# Patient Record
Sex: MALE | Race: WHITE | NOT HISPANIC OR LATINO | URBAN - METROPOLITAN AREA
[De-identification: names, ages, dates, MRNs, and addresses within clinical notes are randomized per-mention and may not be internally consistent; named-entity substitution may affect disease eponyms.]

---

## 2017-03-01 ENCOUNTER — TRANSCRIBE ORDERS (OUTPATIENT)
Dept: ADMINISTRATIVE | Facility: HOSPITAL | Age: 67
End: 2017-03-01

## 2017-03-01 ENCOUNTER — APPOINTMENT (OUTPATIENT)
Dept: LAB | Facility: MEDICAL CENTER | Age: 67
End: 2017-03-01
Payer: MEDICARE

## 2017-03-01 DIAGNOSIS — I71.2 THORACIC AORTIC ANEURYSM WITHOUT RUPTURE (HCC): ICD-10-CM

## 2017-03-01 DIAGNOSIS — I44.7 LEFT BUNDLE-BRANCH BLOCK: ICD-10-CM

## 2017-03-01 DIAGNOSIS — R73.09 OTHER ABNORMAL GLUCOSE: ICD-10-CM

## 2017-03-01 LAB
CHOLEST SERPL-MCNC: 215 MG/DL (ref 50–200)
EST. AVERAGE GLUCOSE BLD GHB EST-MCNC: 117 MG/DL
HBA1C MFR BLD: 5.7 % (ref 4.2–6.3)
HDLC SERPL-MCNC: 52 MG/DL (ref 40–60)
LDLC SERPL CALC-MCNC: 129 MG/DL (ref 0–100)
TRIGL SERPL-MCNC: 171 MG/DL

## 2017-03-01 PROCEDURE — 80061 LIPID PANEL: CPT

## 2017-03-01 PROCEDURE — 36415 COLL VENOUS BLD VENIPUNCTURE: CPT

## 2017-03-01 PROCEDURE — 83036 HEMOGLOBIN GLYCOSYLATED A1C: CPT

## 2017-03-09 ENCOUNTER — HOSPITAL ENCOUNTER (OUTPATIENT)
Dept: NON INVASIVE DIAGNOSTICS | Facility: HOSPITAL | Age: 67
Discharge: HOME | End: 2017-03-09
Payer: MEDICARE

## 2017-03-09 DIAGNOSIS — R73.09 OTHER ABNORMAL GLUCOSE: ICD-10-CM

## 2017-03-09 PROCEDURE — 93225 XTRNL ECG REC<48 HRS REC: CPT

## 2017-03-09 PROCEDURE — 93226 XTRNL ECG REC<48 HR SCAN A/R: CPT

## 2017-11-01 ENCOUNTER — HOSPITAL ENCOUNTER (OUTPATIENT)
Dept: NON INVASIVE DIAGNOSTICS | Facility: CLINIC | Age: 67
Discharge: HOME | End: 2017-11-01
Payer: MEDICARE

## 2017-11-01 DIAGNOSIS — R73.09 OTHER ABNORMAL GLUCOSE: ICD-10-CM

## 2017-11-01 PROCEDURE — 93306 TTE W/DOPPLER COMPLETE: CPT

## 2017-11-21 ENCOUNTER — ALLSCRIPTS OFFICE VISIT (OUTPATIENT)
Dept: OTHER | Facility: OTHER | Age: 67
End: 2017-11-21

## 2017-11-21 ENCOUNTER — TRANSCRIBE ORDERS (OUTPATIENT)
Dept: ADMINISTRATIVE | Facility: HOSPITAL | Age: 67
End: 2017-11-21

## 2017-11-21 DIAGNOSIS — I35.9 AORTIC VALVE DISEASE: Primary | ICD-10-CM

## 2018-01-15 NOTE — CONSULTS
Chief Complaint  Patient presents for a routine follow up with test results  History of Present Illness  Aortic root replacement with a Bovie aortic tissue valve April 10, 2013  He went home on amiodarone for one month  No further episodes of atrial fibrillation or flutter  His initial surgery was mechanical aortic valve replacement the year 2000  At that time he was quoted as a unicuspid valve  He has multiple sclerosis and is under therapy at this time  Holter counter in may of 2013 showed bradycardia  He is off the amiodarone  At that time he was on amiodarone and beta blocker  He is presently just on Metroprolol 100 daily  During his heart catheterization his coronaries were normal   He's had C4-C5 anterior cervical discectomy and fusion at [facility]  He has a history of colon polyps  His a history of basal cell carcinoma 3 separate occasions  There is a family history of melanoma  He has no issues with his cardiac system at the present time  Echocardiogram done June of 2013  Mild left ventricular hypertrophy with ejection fraction of 60%  There is left atrial enlargement  The proximal aorta 3 9 cm    Patient seen 11 6 2014  As noted above he has a low been aortic tissue valve put in April 2013  His aortic root has been repaired at that time as well  He also has multiple sclerosis, he had postoperative atrial fibrillation, no atrial fibrillation since his surgery, he is off amiodarone, his coronaries are normal, he has cervical discectomy and fusion at [facility] in the past,  Recently had an episode of near syncope  His is followed by diaphoresis  He went to [facility]  Monitor for 4 days  No rigidity is noted  In addition he had an event recorder past months  No evidence of atrial fib  Last echocardiogram June 2014 shows left ventricle normal  Prosthetic valve is functioning well  There are no cardiac issues    His multiple sclerosis seems to be progressive he is going to [facility]    Patient seen November 10, 2015  He had a Bovie and aortic valve and aortic root replacement April 2013  First mechanical valves in year 2000  He has had no further atrial fibrillation  The new issue for him if she has developed a first-degree AV block and a left bundle-branch block pattern  He's had no syncope or near-syncope  His multiple sclerosis unfortunately has progressive  He an echocardiogram in June  His aortic valve is functioning well  Plan an echocardiogram in one year and a Holter before that time  He'll report any syncope  Patient seen November 1, 2016  He has second bioprosthetic valve  Aortic root  2013  He's had no further atrial fibrillation  Echocardiogram shows a 12 mm gradient  His primary issue has multiple sclerosis under therapy  Cardiac standpoint he has been very stable    Patient seen November 21, 2017  Echocardiogram shows a very mild paravalvular leak  This is his 2nd valve in the aortic root was repaired  It was done in 2013  He is getting a new drug for his multiple sclerosis  He is progressing slowly  He is really doing all of things he wants to do  He still hunts  From cardiac standpoint he has no limitation  He did not have any bypass surgery  His LDL is 129  We will think about putting him on a statin  Review of Systems      Cardiac: has heart murmur present, but No complaints of chest pain, no palpitations, no fainiting  Skin: History basal cell  Genitourinary: No complaints of recurrent urinary tract infections, frequent urination at night, difficult urination, blood in urine, kidney stones, loss of bladder control, no kidney or prostate problems, no erectile dysfunction  Psychological: depression   Doing better and accepting his limitations better  General:  Gained weight  Was taking some steroids  Respiratory: No complaints of shortness of breath, cough with sputum, or wheezing     HEENT: No complaints of serious problems, hearing problems, nose problems, throat problems, or snoring  Gastrointestinal: No complaints of liver problems, nausea, vomiting, heartburn, constipation, bloody stools, diarrhea, problems swallowing, adbominal pain, or rectal bleeding  Neurological: Multiple sclerosis progressive  Musculoskeletal: Increasing disability on the right side  Active Problems    1  Abnormal blood sugar (790 29) (R73 09)   2  Aneurysm of thoracic aorta (441 2) (I71 2)   3  Aortic stenosis (424 1) (I35 0)   4  Aortic valve disorder (424 1) (I35 9)   5  Atrial fibrillation (427 31) (I48 91)   6  LBBB (left bundle branch block) (426 3) (I44 7)   7  Medication Intolerance (995 27)   8  Multiple sclerosis (340) (G35)    Past Medical History    · History of Atrial flutter (427 32) (I48 92)   · History of basal cell carcinoma (V10 83) (Z85 828)   · History of Inflammatory Polyps Of Colon (556 4)    Surgical History    · History of Aortic Valve Replacement   · History of Spinal Diskectomy Cervical    Family History    · Family history of Mitral Valve Disorder    · Family history of Malignant Melanoma Of The Skin (V16 8)    Social History    · Denied: History of Alcohol Use (History)   · Marital History - Currently    · Never A Smoker  The social history was reviewed and is unchanged  Current Meds   1  Amoxicillin 500 MG Oral Tablet; take 4 tabs 1 hour prior to dental work  Requested for:   [id number]; Last Rx:23Dec2013 Ordered   2  Aspirin 81 MG TABS; Take 1 tablet daily; Therapy: (Recorded:23Dec2013) to Recorded   3  Biotin TABS; Therapy: (Recorded:10Nov2015) to Recorded   4  Fish Oil 1200 MG Oral Capsule; Therapy: (Recorded:10Nov2015) to Recorded   5  Metoprolol Succinate  MG Oral Tablet Extended Release 24 Hour; TAKE 1 TABLET   ONCE DAILY  Requested for: [id number]; Last Rx:[id number] Ordered   6  Multiple Vitamins TABS; Therapy: (Recorded:23Dec2013) to Recorded   7  Vitamin B Complex TABS;    Therapy: (Recorded:10Nov2015) to Recorded   8  Vitamin D3 2000 UNIT Oral Tablet; Therapy: (Recorded:62Qvj2192) to Recorded    The medication list was reviewed and updated today  Allergies    1  Statins   2  Zocor TABS    Vitals   Recorded: 21Nov2017 11:19AM   Heart Rate 55   Systolic 070, LUE, Sitting   Diastolic 80, LUE, Sitting   BP CUFF SIZE Large   Height 5 ft 10 in   Weight 242 lb 1 oz   BMI Calculated 34 73   BSA Calculated 2 26   O2 Saturation 96     Physical Exam    Constitutional   General appearance: Abnormal   Right-sided weakness weight gain  Pulmonary   Auscultation of lungs: Clear to auscultation, no rales, no rhonchi, no wheezing, good air movement  Cardiovascular   Palpation of heart: Normal PMI, no thrills  Mild aortic regurgitation on echo  Carotid pulses: Normal, 2+ bilaterally  Examination of extremities for edema and/or varicosities: Normal     Musculoskeletal Gait and station: Abnormal    Neurologic - Cranial nerves: II - XII intact  His right-sided weakness of multiple sclerosis  Psychiatric - Orientation to person, place, and time: Normal  Mood and affect: Normal       Assessment    1  LBBB (left bundle branch block) (426 3) (I44 7)   2  Abnormal blood sugar (790 29) (R73 09)   3  Aortic stenosis (424 1) (I35 0)   4  Aortic valve disorder (424 1) (I35 9)   5  Atrial fibrillation (427 31) (I48 91)    Plan  Aneurysm of thoracic aorta, Aortic valve disorder    · (1) LIPID PANEL, FASTING; Status:Active; Requested CMW:[id number]; Perform:[facility]; RKK:[id number]; For:Aneurysm of thoracic aorta, Aortic valve disorder; Ordered By:Diego Maria; Aortic valve disorder    · (1) CBC/PLT/DIFF; Status:Active; Requested OCS:[id number]; Perform:[facility]; XIX:[id number];XGRRDYM; For:Aortic valve disorder; Ordered By:Junaid Maria;   · (1) COMPREHENSIVE METABOLIC PANEL; Status:Active; Requested UZQ:[id number];     Perform:[facility]; IBB:[id number];CMNXPWH; For:Aortic valve disorder; Ordered By:Junaid Maria;   · (1) NT- BNP (PRO BRAIN NATRIURETIC PEPTIDE); Status:Active; Requested  QKN:[id number]; Perform:[facility]; JCR:[id number];REDEBSB; For:Aortic valve disorder; Ordered By:Junaid Maria;   · ECHO COMPLETE WITH CONTRAST IF INDICATED; Status:Hold For - Scheduling;  Requested for:21Nov2018; Perform:[facility]; VEP:[id number];VUKQKYT; For:Aortic valve disorder; Ordered By:Dallin Maria;   · EKG/ECG- POC; Status:Active - Perform Order; Requested for: With next appointment;    Perform: In Office; [phone]; Ordered; For:Aortic valve disorder; Ordered By:Junaid Maria;   · HOLTER MONITOR - 24 HOUR; Status:Hold For - Scheduling; Requested  for:21Nov2018; Perform:[facility]; QHK:[id number];GIHBLWC; For:Aortic valve disorder; Ordered By:Junaid Maria;   · Follow Up After Tests Complete Evaluation and Treatment  Follow-up  Status: Hold For -  Scheduling  Requested for: [id number]   Ordered; For: Aortic valve disorder; Ordered By: Will Jones Performed:  Due: 42GCC4718    Discussion/Summary    From a cardiac standpoint he had a Bovie and aortic tissue valve and aortic root repair in April 2013  He had postoperative atrial fibrillation  No evidence of atrial fibrillation since  Recently admitted to [facility] end of diagnoses vasovagal syncope  His multiple sclerosis is his major issue  We had an event recorders done showing no evidence of any arrhythmias  His very sensitive to 8 fibrillation he does not feel any palpitations  I did not see any reason that he cannot drive from a cardiac standpoint  However from a standpoint of multiple sclerosis he may develop weakness in his right leg and may preclude him from driving  He'll be a neurology decision    Patient seen November 10, 2015  As noted in history of present illness, the left bundle-branch block pattern is new   Pulse was a first-degree AV block  His been asymptomatic  Will check the echo and the year  The 48 hour Holter in the interim  Patient seen November 1, 2016  He has been stable  He has gained some weight  He still batting battling his multiple sclerosis his echocardiogram shows a 12 mm gradient  This will be repeated next year    Patient seen November 21st  Cardiac stable  The only issue is perhaps he should be on a statin  He had no vascular disease other than his aortic root been enlarged from bicuspid aortic valve  His LDL is up to 129  Will check in 6 months to make a decision        Signatures   Electronically signed by : MEÑO Miramontes ; Nov 21 2017 11:56AM EST                       (Author)

## 2018-01-15 NOTE — RESULT NOTES
Verified Results  ECHO COMPLETE WITH CONTRAST IF INDICATED [id number] 09:57AM Brian Robledo     Test Name Result Flag Reference   ECHO COMPLETE WITH CONTRAST IF INDICATED (Report)     [address]   [phone]     Transthoracic Echocardiogram   2D, M-mode, and Color Doppler     Study date: 2016     Patient: Jessy Gonzalez   MR number: [id number]   Account number: [de-identified]   : 1950   Age: 72 years   Gender: Male   Status: Outpatient   Location: [place] [address]    Height: 70 in   Weight: 220 4 lb   BP: 128/ 65 mmHg     Indications: Aortic Valve Disorder  Diagnoses: I35 1 - Nonrheumatic aortic (valve) insufficiency     Sonographer: Yeung RCS   Primary Physician: Ling Parks MD   Referring Physician: Rustam Martinez MD   Group: [facility]   Interpreting Physician: Umm Chacon MD     SUMMARY     LEFT VENTRICLE:   Systolic function was normal  Ejection fraction was estimated to be 60 %  This study was inadequate for the evaluation of regional wall motion  There was mild concentric hypertrophy  Doppler parameters were consistent with abnormal left ventricular relaxation   (grade 1 diastolic dysfunction)  VENTRICULAR SEPTUM:   There was dyssynergic motion  These changes are consistent with a   post-thoracotomy state  LEFT ATRIUM:   The atrium was dilated  RIGHT ATRIUM:   The atrium was dilated  MITRAL VALVE:   There was mild annular calcification  AORTIC VALVE:   A bioprosthesis was present  It exhibited normal function  There was very mild stenosis  Valve mean gradient was 12 mmHg  TRICUSPID VALVE:   Estimated peak PA pressure was 35 mmHg  The findings suggest mild pulmonary hypertension  HISTORY: PRIOR HISTORY: Aneurysm thoracic aorta  Left bundle branch block  Atrial fibrillation  Atrial flutter  Syncope   PRIOR PROCEDURES: Bioprosthesis   of aortic valve      PROCEDURE: The study was performed in the [address]  This was a   routine study  The transthoracic approach was used  The study included complete   2D imaging, M-mode, and color Doppler  The heart rate was 59 bpm, at the start   of the study  Images were obtained from the parasternal, apical, subcostal, and   suprasternal notch acoustic windows  Echocardiographic views were limited due   to poor acoustic window availability, decreased penetration, and lung   interference  This was a technically difficult study  LEFT VENTRICLE: Size was normal  Systolic function was normal  Ejection   fraction was estimated to be 60 %  This study was inadequate for the evaluation   of regional wall motion  Wall thickness was mildly increased  There was mild   concentric hypertrophy  DOPPLER: There was an increased relative contribution   of atrial contraction to ventricular filling  Doppler parameters were   consistent with abnormal left ventricular relaxation (grade 1 diastolic   dysfunction)  VENTRICULAR SEPTUM: There was dyssynergic motion  These changes are consistent   with a post-thoracotomy state  RIGHT VENTRICLE: The size was normal  Systolic function was normal  Wall   thickness was normal      LEFT ATRIUM: The atrium was dilated  RIGHT ATRIUM: The atrium was dilated  MITRAL VALVE: There was mild annular calcification  Valve structure was normal    There was normal leaflet separation  DOPPLER: The transmitral velocity was   within the normal range  There was no evidence for stenosis  There was trace   regurgitation  AORTIC VALVE: A bioprosthesis was present  It exhibited normal function  DOPPLER: Transaortic velocity was minimally increased  There was very mild   stenosis  There was no significant regurgitation  TRICUSPID VALVE: The valve structure was normal  There was normal leaflet   separation  DOPPLER: The transtricuspid velocity was within the normal range     There was no evidence for stenosis  There was trace regurgitation  Estimated   peak PA pressure was 35 mmHg  The findings suggest mild pulmonary hypertension  PULMONIC VALVE: Leaflets exhibited normal thickness, no calcification, and   normal cuspal separation  DOPPLER: The transpulmonic velocity was within the   normal range  There was trace regurgitation  PERICARDIUM: There was no pericardial effusion  The pericardium was normal in   appearance  AORTA: The root exhibited normal size  SYSTEMIC VEINS: IVC: The inferior vena cava was normal in size  Respirophasic   changes were normal      MEASUREMENT TABLES     DOPPLER MEASUREMENTS   Aortic valve  (Reference normals)   Peak [place]inton  238 cm/s  (--)   Peak gradient  23 mmHg  (--)   Mean gradient  12 mmHg  (--)     SYSTEM MEASUREMENT TABLES     2D   %FS: 27 8 %   AV Diam: 3 72 cm   EDV(Teich): 127 21 ml   EF(Teich): 53 55 %   ESV(Teich): 59 09 ml   IVSd: 1 24 cm   LA Area: 27 02 cm2   LA Diam: 4 41 cm   LVEDV MOD A4C: 141 96 ml   LVEF MOD A4C: 64 51 %   LVESV MOD A4C: 50 38 ml   LVIDd: 5 16 cm   LVIDs: 3 73 cm   LVLd A4C: 8 53 cm   LVLs A4C: 7 07 cm   LVPWd: 1 21 cm   RA Area: 22 41 cm2   RV Diam : 3 79 cm   SI(Teich): 31 25 ml/m2   SV MOD A4C: 91 59 ml   SV(Cube): 85 68 ml   SV(Teich): 68 12 ml     CW   AV Env  Ti: 356 75 ms   AV MaxP 04 mmHg   AV VTI: 52 85 cm   AV Vmax: 2 23 m/s   AV Vmean: 1 48 m/s   AV meanPG: 10 36 mmHg   TR MaxP 93 mmHg   TR Vmax: 2 69 m/s     PW   E': 0 07 m/s   E/E': 13 08   LVOT Env  Ti: 436 23 ms   LVOT VTI: 51 69 cm   LVOT Vmax: 1 64 m/s   LVOT Vmean: 1 18 m/s   LVOT maxPG: 10 76 mmHg   LVOT meanP 33 mmHg   MV A [place]: 0 94 m/s   MV Dec [place]: 3 38 m/s2   MV DecT: 284 92 ms   MV E [place]: 0 96 m/s   MV E/A Ratio: 1 02     [facility] Accredited Echocardiography [facility]     Prepared and electronically signed by     Darwin Ko MD   Signed 2016 13:14:06

## 2018-01-22 VITALS
OXYGEN SATURATION: 96 % | DIASTOLIC BLOOD PRESSURE: 80 MMHG | HEIGHT: 70 IN | SYSTOLIC BLOOD PRESSURE: 142 MMHG | BODY MASS INDEX: 34.65 KG/M2 | WEIGHT: 242.06 LBS | HEART RATE: 55 BPM

## 2018-03-15 PROCEDURE — 88305 TISSUE EXAM BY PATHOLOGIST: CPT | Performed by: PATHOLOGY

## 2018-03-16 ENCOUNTER — LAB REQUISITION (OUTPATIENT)
Dept: LAB | Facility: HOSPITAL | Age: 68
End: 2018-03-16
Payer: MEDICARE

## 2018-03-16 DIAGNOSIS — D12.3 BENIGN NEOPLASM OF TRANSVERSE COLON: ICD-10-CM

## 2018-03-16 DIAGNOSIS — K57.30 DIVERTICULOSIS OF LARGE INTESTINE WITHOUT PERFORATION OR ABSCESS WITHOUT BLEEDING: ICD-10-CM

## 2018-03-16 DIAGNOSIS — D12.5 BENIGN NEOPLASM OF SIGMOID COLON: ICD-10-CM

## 2018-03-16 DIAGNOSIS — K57.92 DIVERTICULITIS OF INTESTINE WITHOUT PERFORATION OR ABSCESS WITHOUT BLEEDING: ICD-10-CM

## 2018-03-16 DIAGNOSIS — Z86.010 HISTORY OF COLONIC POLYPS: ICD-10-CM

## 2018-03-21 DIAGNOSIS — I35.9 NONRHEUMATIC AORTIC VALVE DISORDER: ICD-10-CM

## 2018-03-21 DIAGNOSIS — I71.2 THORACIC AORTIC ANEURYSM WITHOUT RUPTURE (HCC): ICD-10-CM

## 2018-11-01 ENCOUNTER — APPOINTMENT (OUTPATIENT)
Dept: LAB | Facility: MEDICAL CENTER | Age: 68
End: 2018-11-01
Payer: MEDICARE

## 2018-11-01 DIAGNOSIS — I35.9 NONRHEUMATIC AORTIC VALVE DISORDER: ICD-10-CM

## 2018-11-01 DIAGNOSIS — I71.2 THORACIC AORTIC ANEURYSM WITHOUT RUPTURE (HCC): ICD-10-CM

## 2018-11-01 PROCEDURE — 85025 COMPLETE CBC W/AUTO DIFF WBC: CPT

## 2018-11-01 PROCEDURE — 83880 ASSAY OF NATRIURETIC PEPTIDE: CPT

## 2018-11-01 PROCEDURE — 36415 COLL VENOUS BLD VENIPUNCTURE: CPT

## 2018-11-01 PROCEDURE — 80053 COMPREHEN METABOLIC PANEL: CPT

## 2018-11-02 LAB
ALBUMIN SERPL BCP-MCNC: 4.1 G/DL (ref 3.5–5)
ALP SERPL-CCNC: 81 U/L (ref 46–116)
ALT SERPL W P-5'-P-CCNC: 36 U/L (ref 12–78)
ANION GAP SERPL CALCULATED.3IONS-SCNC: 4 MMOL/L (ref 4–13)
AST SERPL W P-5'-P-CCNC: 21 U/L (ref 5–45)
BASOPHILS # BLD AUTO: 0.08 THOUSANDS/ΜL (ref 0–0.1)
BASOPHILS NFR BLD AUTO: 1 % (ref 0–1)
BILIRUB SERPL-MCNC: 0.56 MG/DL (ref 0.2–1)
BUN SERPL-MCNC: 33 MG/DL (ref 5–25)
CALCIUM SERPL-MCNC: 9.7 MG/DL (ref 8.3–10.1)
CHLORIDE SERPL-SCNC: 105 MMOL/L (ref 100–108)
CO2 SERPL-SCNC: 29 MMOL/L (ref 21–32)
CREAT SERPL-MCNC: 1.3 MG/DL (ref 0.6–1.3)
EOSINOPHIL # BLD AUTO: 0.36 THOUSAND/ΜL (ref 0–0.61)
EOSINOPHIL NFR BLD AUTO: 4 % (ref 0–6)
ERYTHROCYTE [DISTWIDTH] IN BLOOD BY AUTOMATED COUNT: 13.6 % (ref 11.6–15.1)
GFR SERPL CREATININE-BSD FRML MDRD: 56 ML/MIN/1.73SQ M
GLUCOSE SERPL-MCNC: 95 MG/DL (ref 65–140)
HCT VFR BLD AUTO: 45.7 % (ref 36.5–49.3)
HGB BLD-MCNC: 15.7 G/DL (ref 12–17)
IMM GRANULOCYTES # BLD AUTO: 0.03 THOUSAND/UL (ref 0–0.2)
IMM GRANULOCYTES NFR BLD AUTO: 0 % (ref 0–2)
LYMPHOCYTES # BLD AUTO: 2.19 THOUSANDS/ΜL (ref 0.6–4.47)
LYMPHOCYTES NFR BLD AUTO: 22 % (ref 14–44)
MCH RBC QN AUTO: 31.9 PG (ref 26.8–34.3)
MCHC RBC AUTO-ENTMCNC: 34.4 G/DL (ref 31.4–37.4)
MCV RBC AUTO: 93 FL (ref 82–98)
MONOCYTES # BLD AUTO: 0.96 THOUSAND/ΜL (ref 0.17–1.22)
MONOCYTES NFR BLD AUTO: 10 % (ref 4–12)
NEUTROPHILS # BLD AUTO: 6.38 THOUSANDS/ΜL (ref 1.85–7.62)
NEUTS SEG NFR BLD AUTO: 63 % (ref 43–75)
NRBC BLD AUTO-RTO: 0 /100 WBCS
NT-PROBNP SERPL-MCNC: 45 PG/ML
PLATELET # BLD AUTO: 274 THOUSANDS/UL (ref 149–390)
PMV BLD AUTO: 10.4 FL (ref 8.9–12.7)
POTASSIUM SERPL-SCNC: 4.9 MMOL/L (ref 3.5–5.3)
PROT SERPL-MCNC: 7.8 G/DL (ref 6.4–8.2)
RBC # BLD AUTO: 4.92 MILLION/UL (ref 3.88–5.62)
SODIUM SERPL-SCNC: 138 MMOL/L (ref 136–145)
WBC # BLD AUTO: 10 THOUSAND/UL (ref 4.31–10.16)

## 2018-11-07 ENCOUNTER — OFFICE VISIT (OUTPATIENT)
Dept: CARDIOLOGY CLINIC | Facility: CLINIC | Age: 68
End: 2018-11-07
Payer: MEDICARE

## 2018-11-07 VITALS
SYSTOLIC BLOOD PRESSURE: 140 MMHG | HEART RATE: 59 BPM | BODY MASS INDEX: 33.69 KG/M2 | WEIGHT: 235.3 LBS | HEIGHT: 70 IN | DIASTOLIC BLOOD PRESSURE: 90 MMHG | OXYGEN SATURATION: 98 %

## 2018-11-07 DIAGNOSIS — Z95.2 STATUS POST COMBINED AORTIC ROOT AND VALVE REPLACEMENT USING STENTLESS BIOPROSTHETIC AORTIC VALVE: ICD-10-CM

## 2018-11-07 DIAGNOSIS — Z95.4 STATUS POST COMBINED AORTIC ROOT AND VALVE REPLACEMENT USING STENTLESS BIOPROSTHETIC AORTIC VALVE: ICD-10-CM

## 2018-11-07 DIAGNOSIS — I44.7 LBBB (LEFT BUNDLE BRANCH BLOCK): Primary | ICD-10-CM

## 2018-11-07 PROCEDURE — 93000 ELECTROCARDIOGRAM COMPLETE: CPT | Performed by: INTERNAL MEDICINE

## 2018-11-07 PROCEDURE — 99214 OFFICE O/P EST MOD 30 MIN: CPT | Performed by: INTERNAL MEDICINE

## 2018-11-07 RX ORDER — NICOTINE POLACRILEX 2 MG
GUM BUCCAL
COMMUNITY
End: 2022-07-22

## 2018-11-07 RX ORDER — DIPHENOXYLATE HYDROCHLORIDE AND ATROPINE SULFATE 2.5; .025 MG/1; MG/1
1 TABLET ORAL DAILY
COMMUNITY

## 2018-11-07 RX ORDER — B-COMPLEX WITH VITAMIN C
TABLET ORAL
COMMUNITY

## 2018-11-07 RX ORDER — METOPROLOL SUCCINATE 100 MG/1
100 TABLET, EXTENDED RELEASE ORAL DAILY
Refills: 3 | COMMUNITY
Start: 2018-10-24 | End: 2019-01-25 | Stop reason: SDUPTHER

## 2018-11-07 RX ORDER — AMOXICILLIN 500 MG
CAPSULE ORAL
COMMUNITY

## 2018-11-07 NOTE — PROGRESS NOTES
Follow-up - Cardiology   Claudy Fernandez Mindy 76 y o  male MRN: [id number]        Problems    Problem List Items Addressed This Visit     None      Visit Diagnoses     LBBB (left bundle branch block)    -  Primary    Relevant Medications    metoprolol succinate (TOPROL-XL) 100 mg 24 hr tablet    Other Relevant Orders    POCT ECG    Status post combined aortic root and valve replacement using stentless bioprosthetic aortic valve                Plan check echocardiogram in 1 year  No change in medication  Prior history of atrial fibrillation but none since surgery, coronary is known to be normal          HPI: Karuna Umaña is a 76y o  year old male History of Present Illness  Aortic root replacement with a Bovie aortic tissue valve April 10, 2013  He went home on amiodarone for one month  No further episodes of atrial fibrillation or flutter  His initial surgery was mechanical aortic valve replacement the year 2000  At that time he was quoted as a unicuspid valve  He has multiple sclerosis and is under therapy at this time  Holter counter in may of 2013 showed bradycardia  He is off the amiodarone  At that time he was on amiodarone and beta blocker  He is presently just on Metroprolol 100 daily  During his heart catheterization his coronaries were normal   He's had C4-C5 anterior cervical discectomy and fusion at [facility]  He has a history of colon polyps  His a history of basal cell carcinoma 3 separate occasions  There is a family history of melanoma  He has no issues with his cardiac system at the present time  Echocardiogram done June of 2013  Mild left ventricular hypertrophy with ejection fraction of 60%  There is left atrial enlargement  The proximal aorta 3 9 cm     Patient seen 11 6 2014  As noted above he has a low been aortic tissue valve put in April 2013  His aortic root has been repaired at that time as well    He also has multiple sclerosis, he had postoperative atrial fibrillation, no atrial fibrillation since his surgery, he is off amiodarone, his coronaries are normal, he has cervical discectomy and fusion at [facility] in the past,  Recently had an episode of near syncope  His is followed by diaphoresis  He went to [facility]  Monitor for 4 days  No rigidity is noted  In addition he had an event recorder past months  No evidence of atrial fib  Last echocardiogram June 2014 shows left ventricle normal  Prosthetic valve is functioning well  There are no cardiac issues  His multiple sclerosis seems to be progressive he is going to [address]     Patient seen November 10, 2015  He had a Bovie and aortic valve and aortic root replacement April 2013  First mechanical valves in year 2000  He has had no further atrial fibrillation  The new issue for him if she has developed a first-degree AV block and a left bundle-branch block pattern  He's had no syncope or near-syncope  His multiple sclerosis unfortunately has progressive  He an echocardiogram in June  His aortic valve is functioning well  Plan an echocardiogram in one year and a Holter before that time  He'll report any syncope      Patient seen November 1, 2016  He has second bioprosthetic valve  Aortic root  2013  He's had no further atrial fibrillation  Echocardiogram shows a 12 mm gradient  His primary issue has multiple sclerosis under therapy  Cardiac standpoint he has been very stable     Patient seen November 21, 2017  Echocardiogram shows a very mild paravalvular leak  This is his 2nd valve in the aortic root was repaired  It was done in 2013      He is getting a new drug for his multiple sclerosis  He is progressing slowly  He is really doing all of things he wants to do  He still hunts  From cardiac standpoint he has no limitation  He did not have any bypass surgery  His LDL is 129  We will think about putting him on a statin      Patient seen November 7, 2018  [facility]ocardiogram done on [facility]  It was reviewed  Bioprosthetic valve well seated  No aortic regurgitation  No stenosis  Stable left bundle branch block pattern  Stable from a cardiac standpoint  Not on a statin as yet  Risk in the 12-15% range  Known normal coronaries  Nondiabetic        Review of Systems   Constitutional: Negative  Respiratory: Negative  Musculoskeletal:        Slowly progressive multiple sclerosis   Hematological: Negative  Psychiatric/Behavioral: Negative  Past Medical History:   Diagnosis Date    Aortic stenosis     Aortic valve disorder     Atrial fibrillation (HCC)     Left bundle branch block (LBBB)     Multiple sclerosis (HCC)      History   Alcohol Use No     History   Drug use: Unknown     History   Smoking Status    Never Smoker   Smokeless Tobacco    Never Used       Allergies: Allergies   Allergen Reactions    Simvastatin-High Dose        Medications:     Current Outpatient Prescriptions:     aspirin 81 MG tablet, Take 1 tablet by mouth daily, Disp: , Rfl:     B Complex Vitamins (VITAMIN B COMPLEX) TABS, Take by mouth, Disp: , Rfl:     Biotin 1 MG CAPS, Take by mouth, Disp: , Rfl:     Cholecalciferol 4000 units CAPS, Take by mouth, Disp: , Rfl:     metoprolol succinate (TOPROL-XL) 100 mg 24 hr tablet, Take 100 mg by mouth daily, Disp: , Rfl: 3    multivitamin (THERAGRAN) TABS, Take 1 tablet by mouth daily, Disp: , Rfl:     Omega-3 Fatty Acids (FISH OIL) 1200 MG CAPS, Take by mouth, Disp: , Rfl:       Physical Exam   Constitutional: He appears well-developed  Cardiovascular: Regular rhythm  No aortic regurgitation   Pulmonary/Chest: Breath sounds normal    Musculoskeletal: He exhibits no edema     Neurological:   Multiple sclerosis with weakness on the right leg         Laboratory Studies:  CMP:  Results from last 7 days  Lab Units 11/01/18  1128   POTASSIUM mmol/L 4 9   CHLORIDE mmol/L 105   CO2 mmol/L 29   BUN mg/dL 33*   CREATININE mg/dL 1 30   CALCIUM mg/dL 9 7   AST U/L 21   ALT U/L 36   ALK PHOS U/L 81   EGFR ml/min/1 73sq m 56     NT-proBNP:   Lab Results   Component Value Date    NTBNP 45 2018      Coags:    Lipid Profile:   Lab Results   Component Value Date    CHOL 175 2014     Lab Results   Component Value Date    HDL 52 2017     Lab Results   Component Value Date    LDLCALC 129 (H) 2017     Lab Results   Component Value Date    TRIG 171 (H) 2017       Cardiac testing:     EKG reviewed personally:  Left bundle branch block-stable pattern    Results for orders placed during the hospital encounter of 17   Echo complete with contrast if indicated    Narrative Arnold 175  [facility] [facility], [address] [place] [address]  [phone]    Transthoracic Echocardiogram  2D, M-mode, Doppler, and Color Doppler    Study date:  2017    Patient: Josy Soliman  MR number: [id number]  Account number: [de-identified]  : 1950  Age: 79 years  Gender: Male  Status: Outpatient  Location: [address] [place] [address] [facility]  Height: 70 in  Weight: 236 lb  BP: 130/ 74 mmHg    Indications: AVR    Diagnoses: I35 9 - Nonrheumatic aortic valve disorder, unspecified    Sonographer:  TIMBO Smith  Primary Physician:  Sony Valencia MD  Referring Physician:  Get Motta MD  Group:  [facility]  Cardiology Fellow:  Karlie Cazares MD  Interpreting Physician:  Get Motta MD    SUMMARY    LEFT VENTRICLE:  Size was normal   Systolic function was normal  Ejection fraction was estimated to be 50 %  This study was inadequate for the evaluation of regional wall motion  Wall thickness was at the upper limits of normal   The changes were consistent with concentric remodeling (increased wall thickness with normal wall mass)    Features were consistent with a pseudonormal left ventricular filling pattern, with concomitant abnormal relaxation and increased filling pressure (grade 2 diastolic dysfunction)  RIGHT VENTRICLE:  The size was normal   Systolic function was normal   Wall thickness was normal     LEFT ATRIUM:  The atrium was mildly dilated  AORTIC VALVE:  Aortic bioprosthesis in place with mild paravalvular regurgitation  There was trace regurgitation  TRICUSPID VALVE:  There was mild regurgitation  Pulmonary artery systolic pressure was at the upper limits of normal   Estimated peak PA pressure was 35 mmHg  IVC, HEPATIC VEINS:  The inferior vena cava was mildly dilated  HISTORY: PRIOR HISTORY: Atrial flutter, atrial fibrillation, LBBB, bioprosthetic AVR, aortic aneurysm repair    PROCEDURE: The study was performed in the [address] [facility]  This was a routine study  The transthoracic approach was used  The study included complete 2D imaging, M-mode, complete spectral Doppler, and color Doppler  Echocardiographic views were limited due to decreased penetration and lung interference  Image quality was adequate  LEFT VENTRICLE: Size was normal  Systolic function was normal  Ejection fraction was estimated to be 50 %  This study was inadequate for the evaluation of regional wall motion  Wall thickness was at the upper limits of normal  The changes  were consistent with concentric remodeling (increased wall thickness with normal wall mass)  DOPPLER: Features were consistent with a pseudonormal left ventricular filling pattern, with concomitant abnormal relaxation and increased filling  pressure (grade 2 diastolic dysfunction)  RIGHT VENTRICLE: The size was normal  Systolic function was normal  Wall thickness was normal     LEFT ATRIUM: The atrium was mildly dilated  RIGHT ATRIUM: Size was normal     MITRAL VALVE: Valve structure was normal  There was mild thickening  There was mild calcification of the anterior and posterior leaflets  There was normal leaflet separation  DOPPLER: The transmitral velocity was within the normal range    There was no evidence for stenosis  There was no significant regurgitation  AORTIC VALVE: Aortic bioprosthesis in place with mild paravalvular regurgitation DOPPLER: There was no evidence for stenosis  There was trace regurgitation  TRICUSPID VALVE: The valve structure was normal  There was normal leaflet separation  DOPPLER: The transtricuspid velocity was within the normal range  There was no evidence for stenosis  There was mild regurgitation  Pulmonary artery  systolic pressure was at the upper limits of normal  Estimated peak PA pressure was 35 mmHg  PULMONIC VALVE: Leaflets exhibited normal thickness, no calcification, and normal cuspal separation  DOPPLER: The transpulmonic velocity was within the normal range  There was no significant regurgitation  PERICARDIUM: There was no pericardial effusion  The pericardium was normal in appearance  AORTA: The root exhibited normal size  SYSTEMIC VEINS: IVC: The inferior vena cava was mildly dilated  Respirophasic changes were normal     SYSTEM MEASUREMENT TABLES    2D  %FS: 27 06 %  AV Diam: 3 84 cm  EDV(Teich): 121 72 ml  EF Biplane: 51 34 %  EF(Cube): 61 19 %  EF(Teich): 52 47 %  ESV(Cube): 50 36 ml  ESV(Teich): 57 86 ml  IVSd: 1 2 cm  LA Area: 23 42 cm2  LA Diam: 4 38 cm  LVEDV MOD A2C: 217 21 ml  LVEDV MOD A4C: 144 27 ml  LVEDV MOD BP: 189 06 ml  LVEF MOD A2C: 55 75 %  LVEF MOD A4C: 40 88 %  LVESV MOD A2C: 96 11 ml  LVESV MOD A4C: 85 29 ml  LVESV MOD BP: 92 01 ml  LVIDd: 5 06 cm  LVIDs: 3 69 cm  LVLd A2C: 10 cm  LVLd A4C: 8 69 cm  LVLs A2C: 8 38 cm  LVLs A4C: 8 11 cm  LVPWd: 1 08 cm  RA Area: 18 54 cm2  RV Diam : 3 95 cm  SV MOD A2C: 121 1 ml  SV MOD A4C: 58 98 ml  SV(Cube): 79 41 ml  SV(Teich): 63 86 ml    CW  AV Env  Ti: 373 38 ms  AV SV: 566 51 ml  AV VTI: 48 89 cm  AV Vmax: 2 08 m/s  AV Vmean: 1 31 m/s  AV maxP 34 mmHg  AV meanP 17 mmHg  TR Vmax: 2 68 m/s  TR maxP 65 mmHg    MM  TAPSE: 2 1 cm    PW  AVC: 456 04 ms  E': 0 05 m/s  E/E': 23 48  LVOT Env  Ti: 406 65 ms  LVOT VTI: 25 4 cm  LVOT Vmax: 1 03 m/s  LVOT Vmean: 0 63 m/s  LVOT maxP 31 mmHg  LVOT meanP 96 mmHg  MV A [place]: 1 05 m/s  MV Dec [place]: 3 19 m/s2  MV DecT: 362 55 ms  MV E [place]: 1 16 m/s  MV E/A Ratio: 1 1    [facility]    Prepared and electronically signed by    Sima Flores MD  Signed [id number] 11:44:32       No results found for this or any previous visit  No results found for this or any previous visit  No results found for this or any previous visit  Sima Flores MD    Portions of the record may have been created with voice recognition software   Occasional wrong word or "sound a like" substitutions may have occurred due to the inherent limitations of voice recognition software   Read the chart carefully and recognize, using context, where substitutions have occurred

## 2018-11-12 ENCOUNTER — HOSPITAL ENCOUNTER (OUTPATIENT)
Dept: NON INVASIVE DIAGNOSTICS | Facility: CLINIC | Age: 68
Discharge: HOME | End: 2018-11-12
Payer: MEDICARE

## 2018-11-12 DIAGNOSIS — Z95.2 STATUS POST COMBINED AORTIC ROOT AND VALVE REPLACEMENT USING STENTLESS BIOPROSTHETIC AORTIC VALVE: ICD-10-CM

## 2018-11-12 DIAGNOSIS — I44.7 LBBB (LEFT BUNDLE BRANCH BLOCK): ICD-10-CM

## 2018-11-12 DIAGNOSIS — Z95.4 STATUS POST COMBINED AORTIC ROOT AND VALVE REPLACEMENT USING STENTLESS BIOPROSTHETIC AORTIC VALVE: ICD-10-CM

## 2018-11-12 DIAGNOSIS — I35.9 NONRHEUMATIC AORTIC VALVE DISORDER: ICD-10-CM

## 2018-11-12 PROCEDURE — 93306 TTE W/DOPPLER COMPLETE: CPT | Performed by: INTERNAL MEDICINE

## 2018-11-12 PROCEDURE — 93225 XTRNL ECG REC<48 HRS REC: CPT

## 2018-11-12 PROCEDURE — 93226 XTRNL ECG REC<48 HR SCAN A/R: CPT

## 2018-11-12 PROCEDURE — 93306 TTE W/DOPPLER COMPLETE: CPT

## 2018-11-21 DIAGNOSIS — I35.9 NONRHEUMATIC AORTIC VALVE DISORDER: ICD-10-CM

## 2019-01-23 RX ORDER — METOPROLOL SUCCINATE 100 MG/1
TABLET, EXTENDED RELEASE ORAL
Qty: 90 TABLET | Refills: 3 | Status: CANCELLED | OUTPATIENT
Start: 2019-01-23

## 2019-01-25 DIAGNOSIS — I10 HYPERTENSION, UNSPECIFIED TYPE: Primary | ICD-10-CM

## 2019-01-25 RX ORDER — METOPROLOL SUCCINATE 100 MG/1
100 TABLET, EXTENDED RELEASE ORAL DAILY
Qty: 90 TABLET | Refills: 2 | Status: SHIPPED | OUTPATIENT
Start: 2019-01-25 | End: 2019-10-15 | Stop reason: SDUPTHER

## 2019-10-15 DIAGNOSIS — I10 HYPERTENSION, UNSPECIFIED TYPE: ICD-10-CM

## 2019-10-15 RX ORDER — METOPROLOL SUCCINATE 100 MG/1
TABLET, EXTENDED RELEASE ORAL
Qty: 90 TABLET | Refills: 2 | Status: SHIPPED | OUTPATIENT
Start: 2019-10-15 | End: 2020-08-24

## 2019-11-19 ENCOUNTER — OFFICE VISIT (OUTPATIENT)
Dept: CARDIOLOGY CLINIC | Facility: CLINIC | Age: 69
End: 2019-11-19
Payer: MEDICARE

## 2019-11-19 VITALS
DIASTOLIC BLOOD PRESSURE: 76 MMHG | WEIGHT: 240.4 LBS | OXYGEN SATURATION: 95 % | HEART RATE: 65 BPM | BODY MASS INDEX: 34.41 KG/M2 | SYSTOLIC BLOOD PRESSURE: 138 MMHG | HEIGHT: 70 IN

## 2019-11-19 DIAGNOSIS — I10 HYPERTENSION, UNSPECIFIED TYPE: Primary | ICD-10-CM

## 2019-11-19 DIAGNOSIS — Z95.4 STATUS POST COMBINED AORTIC ROOT AND VALVE REPLACEMENT USING STENTLESS BIOPROSTHETIC AORTIC VALVE: ICD-10-CM

## 2019-11-19 DIAGNOSIS — I44.7 LBBB (LEFT BUNDLE BRANCH BLOCK): ICD-10-CM

## 2019-11-19 DIAGNOSIS — Z95.2 STATUS POST COMBINED AORTIC ROOT AND VALVE REPLACEMENT USING STENTLESS BIOPROSTHETIC AORTIC VALVE: ICD-10-CM

## 2019-11-19 PROCEDURE — 99214 OFFICE O/P EST MOD 30 MIN: CPT | Performed by: INTERNAL MEDICINE

## 2019-11-19 NOTE — PROGRESS NOTES
Follow-up - Cardiology   Scot Marquez [place] 71 y o  male MRN: [id number]        Problems    Problem List Items Addressed This Visit     None      Visit Diagnoses     Hypertension, unspecified type    -  Primary    LBBB (left bundle branch block)        Status post combined aortic root and valve replacement using stentless bioprosthetic aortic valve                Plan patient seen November 19, 2019  In the year 2000 aortic valve replacement  In 2000 13 he had a aortic root replaced and a Bovie in aortic tissue valve put in  This was April 10, 2013  He had atrial fibrillation had 1 month treatment with amiodarone  He has a left bundle branch block pattern  He is on beta-blocker  Coronaries are normal in the past catheterization  He had a cervical diskectomy in [facility]  Is basal cell carcinomas removed  Holter counter November 2018 with his left bundle shows no high-grade block  LDL is 105  Creatinine is 1 3  Last echocardiogram November 2018 shows ejection fraction 55% with LVH  Bovie in valve is fine  Just plan on echocardiogram in a year  He is being treated for multiple sclerosis          HPI: Ochoa Zuluaga is a 71y o  year old male HPI: Ochoa Zuluaga is a 76y o  year old male History of Present Illness  Aortic root replacement with a Bovie aortic tissue valve April 10, 2013  He went home on amiodarone for one month  No further episodes of atrial fibrillation or flutter  His initial surgery was mechanical aortic valve replacement the year 2000  At that time he was quoted as a unicuspid valve  He has multiple sclerosis and is under therapy at this time  Holter counter in may of 2013 showed bradycardia  He is off the amiodarone  At that time he was on amiodarone and beta blocker  He is presently just on Metroprolol 100 daily    During his heart catheterization his coronaries were normal   He's had C4-C5 anterior cervical discectomy and fusion at [facility]  He has a history of colon polyps  His a history of basal cell carcinoma 3 separate occasions  There is a family history of melanoma  He has no issues with his cardiac system at the present time  Echocardiogram done June of 2013  Mild left ventricular hypertrophy with ejection fraction of 60%  There is left atrial enlargement  The proximal aorta 3 9 cm     Patient seen 11 6 2014  As noted above he has a low been aortic tissue valve put in April 2013  His aortic root has been repaired at that time as well  He also has multiple sclerosis, he had postoperative atrial fibrillation, no atrial fibrillation since his surgery, he is off amiodarone, his coronaries are normal, he has cervical discectomy and fusion at [facility] in the past,  Recently had an episode of near syncope  His is followed by diaphoresis  He went to [facility]  Monitor for 4 days  No rigidity is noted  In addition he had an event recorder past months  No evidence of atrial fib  Last echocardiogram June 2014 shows left ventricle normal  Prosthetic valve is functioning well  There are no cardiac issues  His multiple sclerosis seems to be progressive he is going to [address]     Patient seen November 10, 2015  He had a Bovie and aortic valve and aortic root replacement April 2013  First mechanical valves in year 2000  He has had no further atrial fibrillation  The new issue for him if she has developed a first-degree AV block and a left bundle-branch block pattern  He's had no syncope or near-syncope  His multiple sclerosis unfortunately has progressive  He an echocardiogram in June  His aortic valve is functioning well  Plan an echocardiogram in one year and a Holter before that time  He'll report any syncope      Patient seen November 1, 2016  He has second bioprosthetic valve  Aortic root  2013  He's had no further atrial fibrillation  Echocardiogram shows a 12 mm gradient  His primary issue has multiple sclerosis under therapy  Cardiac standpoint he has been very stable     Patient seen November 21, 2017  Echocardiogram shows a very mild paravalvular leak  This is his 2nd valve in the aortic root was repaired  It was done in 2013  Review of Systems   Constitutional: Negative  Respiratory: Negative  Cardiovascular: Negative  Neurological: Negative  Past Medical History:   Diagnosis Date    Aortic stenosis     Aortic valve disorder     Atrial fibrillation (HCC)     Left bundle branch block (LBBB)     Multiple sclerosis (HCC)      Social History     Substance and Sexual Activity   Alcohol Use No     Social History     Substance and Sexual Activity   Drug Use Not on file     Social History     Tobacco Use   Smoking Status Never Smoker   Smokeless Tobacco Never Used       Allergies: Allergies   Allergen Reactions    Simvastatin-High Dose        Medications:     Current Outpatient Medications:     aspirin 81 MG tablet, Take 1 tablet by mouth daily, Disp: , Rfl:     B Complex Vitamins (VITAMIN B COMPLEX) TABS, Take by mouth, Disp: , Rfl:     Biotin 1 MG CAPS, Take by mouth, Disp: , Rfl:     Cholecalciferol 4000 units CAPS, Take by mouth, Disp: , Rfl:     metoprolol succinate (TOPROL-XL) 100 mg 24 hr tablet, TAKE 1 TABLET BY MOUTH EVERY DAY, Disp: 90 tablet, Rfl: 2    multivitamin (THERAGRAN) TABS, Take 1 tablet by mouth daily, Disp: , Rfl:     Omega-3 Fatty Acids (FISH OIL) 1200 MG CAPS, Take by mouth, Disp: , Rfl:       Physical Exam   Constitutional: He appears well-developed and well-nourished  No distress  Cardiovascular: Normal rate, regular rhythm and normal heart sounds  No murmur heard  Pulmonary/Chest: Effort normal and breath sounds normal  No stridor  No respiratory distress  Musculoskeletal: He exhibits no edema, tenderness or deformity  Skin: Skin is warm and dry  He is not diaphoretic  No erythema  No pallor           Laboratory Studies:  CMP:      Invalid input(s): ALBUMIN  NT-proBNP: Lab Results   Component Value Date    NTBNP 45 2018      Coags:    Lipid Profile:   Lab Results   Component Value Date    CHOL 175 2014     Lab Results   Component Value Date    HDL 52 2017     Lab Results   Component Value Date    LDLCALC 129 (H) 2017     Lab Results   Component Value Date    TRIG 171 (H) 2017       Cardiac testing:     EKG reviewed personally:     Results for orders placed during the hospital encounter of 18   Echo complete with contrast if indicated    Narrative [address]  [address] [facility], [address] [facility]  [phone]    Transthoracic Echocardiogram  2D, M-mode, Doppler, and Color Doppler    Study date:  2018    Patient: Shiela Saucedo  MR number: [id number]  Account number: [de-identified]  : 1950  Age: 76 years  Gender: Male  Status: Outpatient  Location: [address] [place] [address] [facility] [place]  Height: 70 in  Weight: 234 5 lb  BP: 140/ 90 mmHg    Indications: Left bundle brancj block  Aortic root replacement with a Bovine aortic tissue valve April 10, 2013    Diagnoses: I35 9 - Nonrheumatic aortic valve disorder, unspecified, I44 7 - Left bundle-branch block, unspecified, Z95 2 - Presence of prosthetic heart valve    Sonographer:  Amy Beard BS, RDCS, RVT, RDMS  Primary Physician:  Robin Heimlich, MD  Referring Physician:  Debbie Carranza MD  Group:  [facility]  Interpreting Physician:  Freddy Oliveros MD    SUMMARY    LEFT VENTRICLE:  Size was normal   Systolic function was normal  Ejection fraction was estimated to be 55 %  Wall thickness was mildly increased  Features were consistent with a pseudonormal left ventricular filling pattern, with concomitant abnormal relaxation and increased filling pressure (grade 2 diastolic dysfunction)  VENTRICULAR SEPTUM:  There was "bounce" motion  These changes are consistent with a post-thoracotomy state      RIGHT VENTRICLE:  The size was normal   Systolic function was normal     LEFT ATRIUM:  The atrium was mildly dilated  RIGHT ATRIUM:  The atrium was mildly dilated  MITRAL VALVE:  There was trace regurgitation  AORTIC VALVE:  A bioprosthesis was present  It exhibited normal function  There was trace regurgitation  TRICUSPID VALVE:  There was mild regurgitation  AORTA:  The root exhibited mild dilatation  COMPARISONS:  There has been no significant interval change  Comparison was made with the previous study of 01-Nov-2017  HISTORY: PRIOR HISTORY: LBBB, AVR (for AS) with aortic root replacement 2013, multiple sclerosis  PROCEDURE: The study was performed in the [address] [facility] [place]  This was a routine study  The transthoracic approach was used  The study included complete 2D imaging, M-mode, complete spectral Doppler, and color Doppler  Images were obtained from the parasternal, apical, subcostal, and suprasternal notch acoustic windows  Image quality was good  LEFT VENTRICLE: Size was normal  Systolic function was normal  Ejection fraction was estimated to be 55 %  There were no regional wall motion abnormalities  Wall thickness was mildly increased  DOPPLER: Features were consistent with a  pseudonormal left ventricular filling pattern, with concomitant abnormal relaxation and increased filling pressure (grade 2 diastolic dysfunction)  VENTRICULAR SEPTUM: There was "bounce" motion  These changes are consistent with a post-thoracotomy state  RIGHT VENTRICLE: The size was normal  Systolic function was normal  Wall thickness was normal     LEFT ATRIUM: The atrium was mildly dilated  RIGHT ATRIUM: The atrium was mildly dilated  MITRAL VALVE: Valve structure was normal  There was normal leaflet separation  DOPPLER: The transmitral velocity was within the normal range  There was no evidence for stenosis  There was trace regurgitation  AORTIC VALVE: A bioprosthesis was present   It exhibited normal function  DOPPLER: There was no evidence for stenosis  There was trace regurgitation  TRICUSPID VALVE: The valve structure was normal  There was normal leaflet separation  DOPPLER: The transtricuspid velocity was within the normal range  There was no evidence for stenosis  There was mild regurgitation  The tricuspid jet  envelope definition was inadequate for estimation of RV systolic pressure  There are no indirect findings suggestive of moderate or severe pulmonary hypertension  PULMONIC VALVE: Leaflets exhibited normal thickness, no calcification, and normal cuspal separation  DOPPLER: The transpulmonic velocity was within the normal range  There was mild regurgitation  PERICARDIUM: There was no pericardial effusion  The pericardium was normal in appearance  AORTA: The root exhibited mild dilatation  SYSTEMIC VEINS: IVC: The inferior vena cava was normal in size and course  Respirophasic changes were normal     MEASUREMENT TABLES    2D MEASUREMENTS  Aorta   (Reference normals)  Root diam   38 mm   (--)    SYSTEM MEASUREMENT TABLES    2D  %FS: 19 4 %  Ao Diam: 3 86 cm  EDV(Teich): 125 12 ml  EF Biplane: 53 44 %  EF(Cube): 47 64 %  EF(Teich): 39 67 %  ESV(Cube): 70 41 ml  ESV(Teich): 75 49 ml  IVSd: 1 29 cm  LA Area: 24 79 cm2  LA Diam: 5 35 cm  LVEDV MOD A2C: 97 77 ml  LVEDV MOD A4C: 115 69 ml  LVEDV MOD BP: 107 3 ml  LVEF MOD A2C: 55 14 %  LVEF MOD A4C: 48 57 %  LVESV MOD A2C: 43 86 ml  LVESV MOD A4C: 59 5 ml  LVESV MOD BP: 49 97 ml  LVIDd: 5 12 cm  LVIDs: 4 13 cm  LVLd A2C: 8 77 cm  LVLd A4C: 8 96 cm  LVLs A2C: 7 74 cm  LVLs A4C: 7 77 cm  LVOT Diam: 2 34 cm  LVPWd: 1 11 cm  RA Area: 27 57 cm2  RV Diam : 4 22 cm  SI(Cube): 28 6 ml/m2  SI(Teich): 22 16 ml/m2  SV MOD A2C: 53 91 ml  SV MOD A4C: 56 19 ml  SV(Cube): 64 06 ml  SV(Teich): 49 63 ml    CW  AV Env  Ti: 321 63 ms  AV VTI: 51 71 cm  AV Vmax: 2 14 m/s  AV Vmean: 1 61 m/s  AV maxP 32 mmHg  AV meanP 26 mmHg  TR Vmax: 2 67 m/s  TR maxP 59 mmHg    MM  %FS: 24 56 %  EDV(Teich): 144 81 ml  EF(Teich): 48 24 %  ESV(Teich): 74 95 ml  LVIDd: 5 46 cm  LVIDs: 4 12 cm  TAPSE: 2 29 cm    PW  ZIGGY (VTI): 1 8 cm2  ZIGGY Vmax: 1 93 cm2  E': 0 06 m/s  E/E': 11 8  LVOT Env  Ti: 343 81 ms  LVOT VTI: 21 72 cm  LVOT Vmax: 0 96 m/s  LVOT Vmean: 0 63 m/s  LVOT maxPG: 3 72 mmHg  LVOT meanP 88 mmHg  LVSI Dopp: 41 58 ml/m2  LVSV Dopp: 93 13 ml  MV A [place]: 0 95 m/s  MV Dec [place]: 2 15 m/s2  MV DecT: 341 26 ms  MV E [place]: 0 73 m/s  MV E/A Ratio: 0 77    [facility]    Prepared and electronically signed by    Alyse Saucedo MD  Signed  15:49:53       No results found for this or any previous visit  No results found for this or any previous visit  No results found for this or any previous visit  Marty Hardin MD    Portions of the record may have been created with voice recognition software   Occasional wrong word or "sound a like" substitutions may have occurred due to the inherent limitations of voice recognition software   Read the chart carefully and recognize, using context, where substitutions have occurred

## 2019-12-10 ENCOUNTER — TRANSCRIBE ORDERS (OUTPATIENT)
Dept: ADMINISTRATIVE | Facility: HOSPITAL | Age: 69
End: 2019-12-10

## 2019-12-10 ENCOUNTER — LAB REQUISITION (OUTPATIENT)
Dept: LAB | Facility: HOSPITAL | Age: 69
End: 2019-12-10
Payer: MEDICARE

## 2019-12-10 DIAGNOSIS — R31.9 URINARY TRACT INFECTION WITH HEMATURIA, SITE UNSPECIFIED: Primary | ICD-10-CM

## 2019-12-10 DIAGNOSIS — N39.0 URINARY TRACT INFECTION, SITE NOT SPECIFIED: ICD-10-CM

## 2019-12-10 DIAGNOSIS — N40.1 BENIGN PROSTATIC HYPERPLASIA WITH LOWER URINARY TRACT SYMPTOMS: ICD-10-CM

## 2019-12-10 DIAGNOSIS — N39.0 URINARY TRACT INFECTION WITH HEMATURIA, SITE UNSPECIFIED: Primary | ICD-10-CM

## 2019-12-10 PROCEDURE — 87086 URINE CULTURE/COLONY COUNT: CPT | Performed by: UROLOGY

## 2019-12-11 LAB — BACTERIA UR CULT: NORMAL

## 2019-12-13 ENCOUNTER — HOSPITAL ENCOUNTER (OUTPATIENT)
Dept: RADIOLOGY | Facility: MEDICAL CENTER | Age: 69
Discharge: HOME | End: 2019-12-13
Payer: MEDICARE

## 2019-12-13 DIAGNOSIS — N39.0 URINARY TRACT INFECTION WITH HEMATURIA, SITE UNSPECIFIED: ICD-10-CM

## 2019-12-13 DIAGNOSIS — R31.9 URINARY TRACT INFECTION WITH HEMATURIA, SITE UNSPECIFIED: ICD-10-CM

## 2019-12-13 PROCEDURE — 76770 US EXAM ABDO BACK WALL COMP: CPT

## 2020-08-24 DIAGNOSIS — I10 HYPERTENSION, UNSPECIFIED TYPE: ICD-10-CM

## 2020-08-24 RX ORDER — METOPROLOL SUCCINATE 100 MG/1
100 TABLET, EXTENDED RELEASE ORAL DAILY
Qty: 90 TABLET | Refills: 2 | Status: SHIPPED | OUTPATIENT
Start: 2020-08-24 | End: 2020-11-24 | Stop reason: SDUPTHER

## 2020-10-19 ENCOUNTER — HOSPITAL ENCOUNTER (OUTPATIENT)
Dept: NON INVASIVE DIAGNOSTICS | Facility: CLINIC | Age: 70
Discharge: HOME | End: 2020-10-19
Payer: MEDICARE

## 2020-10-19 DIAGNOSIS — I44.7 LBBB (LEFT BUNDLE BRANCH BLOCK): ICD-10-CM

## 2020-10-19 DIAGNOSIS — I10 HYPERTENSION, UNSPECIFIED TYPE: ICD-10-CM

## 2020-10-19 PROCEDURE — 93306 TTE W/DOPPLER COMPLETE: CPT

## 2020-10-19 PROCEDURE — 93306 TTE W/DOPPLER COMPLETE: CPT | Performed by: INTERNAL MEDICINE

## 2020-11-24 ENCOUNTER — TELEMEDICINE (OUTPATIENT)
Dept: CARDIOLOGY CLINIC | Facility: CLINIC | Age: 70
End: 2020-11-24
Payer: MEDICARE

## 2020-11-24 DIAGNOSIS — I44.7 LBBB (LEFT BUNDLE BRANCH BLOCK): ICD-10-CM

## 2020-11-24 DIAGNOSIS — Z95.2 STATUS POST COMBINED AORTIC ROOT AND VALVE REPLACEMENT USING STENTLESS BIOPROSTHETIC AORTIC VALVE: ICD-10-CM

## 2020-11-24 DIAGNOSIS — I10 HYPERTENSION, UNSPECIFIED TYPE: Primary | ICD-10-CM

## 2020-11-24 DIAGNOSIS — I10 HYPERTENSION, UNSPECIFIED TYPE: ICD-10-CM

## 2020-11-24 DIAGNOSIS — Z95.4 STATUS POST COMBINED AORTIC ROOT AND VALVE REPLACEMENT USING STENTLESS BIOPROSTHETIC AORTIC VALVE: ICD-10-CM

## 2020-11-24 PROCEDURE — 99213 OFFICE O/P EST LOW 20 MIN: CPT | Performed by: INTERNAL MEDICINE

## 2020-11-24 RX ORDER — METHENAMINE HIPPURATE 1000 MG/1
TABLET ORAL
COMMUNITY

## 2020-11-24 RX ORDER — TAMSULOSIN HYDROCHLORIDE 0.4 MG/1
0.4 CAPSULE ORAL
COMMUNITY

## 2020-11-24 RX ORDER — METOPROLOL SUCCINATE 100 MG/1
100 TABLET, EXTENDED RELEASE ORAL DAILY
Qty: 90 TABLET | Refills: 3 | Status: SHIPPED | OUTPATIENT
Start: 2020-11-24 | End: 2021-10-18

## 2020-11-24 RX ORDER — FINASTERIDE 5 MG/1
5 TABLET, FILM COATED ORAL DAILY
COMMUNITY

## 2021-01-25 ENCOUNTER — OFFICE VISIT (OUTPATIENT)
Dept: FAMILY MEDICINE CLINIC | Facility: CLINIC | Age: 71
End: 2021-01-25
Payer: MEDICARE

## 2021-01-25 VITALS
BODY MASS INDEX: 35.84 KG/M2 | WEIGHT: 242 LBS | TEMPERATURE: 97.7 F | OXYGEN SATURATION: 97 % | RESPIRATION RATE: 14 BRPM | SYSTOLIC BLOOD PRESSURE: 138 MMHG | HEIGHT: 69 IN | DIASTOLIC BLOOD PRESSURE: 92 MMHG | HEART RATE: 74 BPM

## 2021-01-25 DIAGNOSIS — E78.1 HYPERTRIGLYCERIDEMIA: ICD-10-CM

## 2021-01-25 DIAGNOSIS — E53.8 CYANOCOBALAMIN DEFICIENCY: ICD-10-CM

## 2021-01-25 DIAGNOSIS — E55.9 VITAMIN D DEFICIENCY: ICD-10-CM

## 2021-01-25 DIAGNOSIS — Z11.59 NEED FOR HEPATITIS C SCREENING TEST: ICD-10-CM

## 2021-01-25 DIAGNOSIS — R73.03 PREDIABETES: Primary | ICD-10-CM

## 2021-01-25 DIAGNOSIS — E54 ASCORBIC ACID DEFICIENCY: ICD-10-CM

## 2021-01-25 DIAGNOSIS — I71.2 THORACIC AORTIC ANEURYSM WITHOUT RUPTURE (HCC): ICD-10-CM

## 2021-01-25 DIAGNOSIS — G35 MULTIPLE SCLEROSIS (HCC): ICD-10-CM

## 2021-01-25 DIAGNOSIS — Z11.59 ENCOUNTER FOR SCREENING FOR OTHER VIRAL DISEASES: ICD-10-CM

## 2021-01-25 DIAGNOSIS — K57.90 DIVERTICULOSIS: ICD-10-CM

## 2021-01-25 DIAGNOSIS — R26.2 AMBULATORY DYSFUNCTION: ICD-10-CM

## 2021-01-25 DIAGNOSIS — I48.91 ATRIAL FIBRILLATION, UNSPECIFIED TYPE (HCC): ICD-10-CM

## 2021-01-25 DIAGNOSIS — R63.8 INCREASED BMI: ICD-10-CM

## 2021-01-25 DIAGNOSIS — E66.01 OBESITY, MORBID (HCC): ICD-10-CM

## 2021-01-25 DIAGNOSIS — E53.1 PYRIDOXINE DEFICIENCY: ICD-10-CM

## 2021-01-25 DIAGNOSIS — Z28.39 IMMUNIZATION DEFICIENCY: ICD-10-CM

## 2021-01-25 DIAGNOSIS — R39.81 FUNCTIONAL URINARY INCONTINENCE: ICD-10-CM

## 2021-01-25 DIAGNOSIS — E51.9 THIAMINE DEFICIENCY: ICD-10-CM

## 2021-01-25 DIAGNOSIS — B35.3 TINEA PEDIS OF BOTH FEET: ICD-10-CM

## 2021-01-25 PROBLEM — I71.20 THORACIC AORTIC ANEURYSM WITHOUT RUPTURE: Status: ACTIVE | Noted: 2021-01-25

## 2021-01-25 PROCEDURE — 99214 OFFICE O/P EST MOD 30 MIN: CPT | Performed by: FAMILY MEDICINE

## 2021-01-25 RX ORDER — CLOTRIMAZOLE 1 %
CREAM (GRAM) TOPICAL 2 TIMES DAILY
Qty: 60 G | Refills: 2 | Status: SHIPPED | OUTPATIENT
Start: 2021-01-25 | End: 2022-07-22

## 2021-01-25 RX ORDER — ICOSAPENT ETHYL 1000 MG/1
2 CAPSULE ORAL 2 TIMES DAILY
Qty: 360 CAPSULE | Refills: 3 | Status: SHIPPED | OUTPATIENT
Start: 2021-01-25 | End: 2021-04-05 | Stop reason: ALTCHOICE

## 2021-01-26 NOTE — PROGRESS NOTES
BMI Counseling: Body mass index is 35 74 kg/m²  The BMI is above normal  Nutrition recommendations include decreasing portion sizes, encouraging healthy choices of fruits and vegetables, limiting drinks that contain sugar and reducing intake of cholesterol  Exercise recommendations include exercising 3-5 times per week  No pharmacotherapy was ordered  Falls Plan of Care: balance, strength, and gait training instructions were provided  Recommended assistive device to help with gait and balance  Patient assessed for orthostatic hypotension  Medications that increase falls were reviewed  Assessed feet and footwear  Vitamin D supplementation was recommended  Assessed visual acuity  Home safety evaluation by OT recommended  Cognitive screening performed  Home safety education provided  Assessment/Plan:    Will need baseline blood work for vitamin levels and vaccination status  Will refer patient to physical therapy if he develops significant weakness  Advised to try Vascepa consider fish oil to lower his triglyceride improved his LDL and also help with circulation improvement  Advised to cranberry juice to protect against UTI  Advised to Shingrix vaccine  Advised for vinegar soak his feet and clotrimazole for tinea pedis  Consider refer him to podiatry for evaluation  Will see him back in 3 months follow-up  I have spent 25 minutes with Patient  today in which greater than 50% of this time was spent in counseling/coordination of care regarding Risks and benefits of tx options           Problem List Items Addressed This Visit        Digestive    Diverticulosis       Cardiovascular and Mediastinum    Atrial fibrillation [facility])    Thoracic aortic aneurysm without rupture (HCC)       Nervous and Auditory    Multiple sclerosis ([facility] [address] )       Other    Prediabetes - Primary    Relevant Orders    CBC and differential    HEMOGLOBIN A1C W/ EAG ESTIMATION    Functional urinary incontinence    Relevant Orders    UA w Reflex to Microscopic w Reflex to Culture    Ambulatory dysfunction    Hypertriglyceridemia    Relevant Medications    Vascepa 1 g CAPS    Other Relevant Orders    Lipid Panel with Direct LDL reflex    Comprehensive metabolic panel    TSH, 3rd generation with Free T4 reflex    Obesity, morbid (HCC)    Relevant Orders    Cortisol Level, AM Specimen      Other Visit Diagnoses     Cyanocobalamin deficiency        Relevant Orders    Vitamin B12    Immunization deficiency        Relevant Orders    Hepatitis A antibody, total    Hepatitis B surface antibody    Measles/Mumps/Rubella Immunity    Vitamin D deficiency        Relevant Orders    Vitamin D 25 hydroxy    Need for hepatitis C screening test        Relevant Orders    Hepatitis C Ab W/Refl To HCV RNA, Qn, PCR ([facility])    Pyridoxine deficiency        Relevant Orders    Vitamin B6    Thiamine deficiency        Relevant Orders    Vitamin B1 (Thiamine), Serum/Plasma, LC/MS/MS    Ascorbic acid deficiency        Relevant Orders    Vitamin C    Encounter for screening for other viral diseases         Relevant Orders    Hepatitis B surface antibody    Tinea pedis of both feet        Relevant Medications    clotrimazole (LOTRIMIN) 1 % cream    Increased BMI                Subjective:      Patient ID: Rosangela Tubbs is a 79 y o  male  80-year-old male for \[facility] [place] [facility]\"" care  His previous PCP he saw a year ago retired  He has multiple sclerosis was diagnose and his 61years old  Start with his right foot turning inward when he developed muscle weakness found he had a spinal tap and was diagnosed with that  Was on multiple medication the past the last 1 was Ocrevus but he has significant side effect with AFib so he was stopped  Currently he does follow-up neurologist with [facility] and not on any medication  He has significant urology issues follow-up urologist   He is on medication to prevent UTI    Had history of elevated PSA but that with back to normal   He also follow-up with ophthalmologist for retinal detachment  [place]  retina  He follow-up dermatologist Dr Rebeca Nagel have for history basal cell skin cancer  He follow-up with endocrinologist for thyroid nodule  And he has diverticulosis follow-up with colorectal surgeon Dr Alejandrina Deleon  He developed significant elevation of CPK with statin  He cannot tolerate anything for high triglyceride  Currently his take fish oil  He is on metoprolol succinate 100 mg daily and tamsulosin 0 4 mg daily  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  He lives at home with his wife  He has Tdap in 2012  Has pneumonia vaccine in 2020  He is scheduled to get COVID vaccine soon  He had thyroid nodule and follow-up with endocrinologist   He follow-up with cardiologist for history of AFib  He had surgery to repair congenital bicuspid aortic valve and to repair aortic aneurysm  He is doing very well now  The following portions of the patient's history were reviewed and updated as appropriate:   Past Medical History:  He has a past medical history of Ambulatory dysfunction (1/25/2021), Aortic stenosis, Aortic valve disorder, Atrial fibrillation ([facility] [address] ), Diverticulosis (1/25/2021), Functional urinary incontinence (1/25/2021), Hypertriglyceridemia (1/25/2021), Left bundle branch block (LBBB), Multiple sclerosis ([facility] [address] ), Multiple sclerosis ([facility] [address] ) (1/25/2021), and Prediabetes (1/25/2021)  ,  _______________________________________________________________________  Medical Problems:  does not have any pertinent problems on file ,  _______________________________________________________________________  Past Surgical History:   has a past surgical history that includes Aortic valve replacement (2001); Cervical spine surgery (02/2013); Colonoscopy (03/15/2018); Colonoscopy (06/10/2014);  Aortic Valve Repair (04/2013); and Retinal laser procedure (02/2018)  ,  _______________________________________________________________________  Family History:  family history includes Heart Valve Disease in his family; Melanoma in his father; Other in his sister; Skin cancer in his family; Valvular heart disease in his mother ,  _______________________________________________________________________  Social History:   reports that he has never smoked  He has never used smokeless tobacco  He reports that he does not drink alcohol  No history on file for drug ,  _______________________________________________________________________  Allergies:  is allergic to simvastatin-high dose and statins     _______________________________________________________________________  Current Outpatient Medications   Medication Sig Dispense Refill    aspirin 81 MG tablet Take 1 tablet by mouth daily      Cholecalciferol 4000 units CAPS Take by mouth      finasteride (PROSCAR) 5 mg tablet Take 5 mg by mouth daily      methenamine hippurate (HIPREX) 1 g tablet Take by mouth TAKE 1/2 TAB EVERY 12 HOURS      metoprolol succinate (TOPROL-XL) 100 mg 24 hr tablet Take 1 tablet (100 mg total) by mouth daily 90 tablet 3    multivitamin (THERAGRAN) TABS Take 1 tablet by mouth daily      Omega-3 Fatty Acids (FISH OIL) 1200 MG CAPS Take by mouth      tamsulosin (FLOMAX) 0 4 mg Take 0 4 mg by mouth daily with dinner      B Complex Vitamins (VITAMIN B COMPLEX) TABS Take by mouth      Biotin 1 MG CAPS Take by mouth      clotrimazole (LOTRIMIN) 1 % cream Apply topically 2 (two) times a day Between digits of toes 60 g 2    Vascepa 1 g CAPS Take 2 capsules (2 g total) by mouth 2 (two) times a day 360 capsule 3     No current facility-administered medications for this visit       _______________________________________________________________________  Review of Systems   Constitutional: Negative for activity change, appetite change, fatigue, fever and unexpected weight change     HENT: Negative for dental problem and trouble swallowing  Eyes: Negative for photophobia and visual disturbance  Respiratory: Negative for cough and chest tightness  Cardiovascular: Negative for chest pain, palpitations and leg swelling  Gastrointestinal: Negative for abdominal pain, constipation and vomiting  Endocrine: Negative for cold intolerance, polydipsia and polyuria  Genitourinary: Negative for difficulty urinating, frequency and urgency  Musculoskeletal: Negative for arthralgias, joint swelling, myalgias and neck pain  Skin: Negative for color change, rash and wound  Allergic/Immunologic: Negative for environmental allergies  Neurological: Negative for dizziness, weakness and numbness  Hematological: Does not bruise/bleed easily  Psychiatric/Behavioral: Negative for decreased concentration, dysphoric mood, self-injury, sleep disturbance and suicidal ideas  Objective:  Vitals:    01/25/21 1144   BP: 138/92   BP Location: Left arm   Patient Position: Sitting   Cuff Size: Standard   Pulse: 74   Resp: 14   Temp: 97 7 °F (36 5 °C)   TempSrc: Tympanic   SpO2: 97%   Weight: 110 kg (242 lb)   Height: 5' 9" (1 753 m)     Body mass index is 35 74 kg/m²  Physical Exam  Vitals signs and nursing note reviewed  Constitutional:       Appearance: He is well-developed  He is obese  HENT:      Head: Normocephalic and atraumatic  Right Ear: Tympanic membrane normal       Left Ear: Tympanic membrane normal       Nose: Nose normal    Eyes:      Pupils: Pupils are equal, round, and reactive to light  Neck:      Musculoskeletal: Normal range of motion and neck supple  Cardiovascular:      Rate and Rhythm: Normal rate and regular rhythm  Pulses: Normal pulses  Heart sounds: Normal heart sounds  Pulmonary:      Effort: Pulmonary effort is normal       Breath sounds: Normal breath sounds  Abdominal:      General: Abdomen is flat   Bowel sounds are normal       Palpations: Abdomen is soft    Musculoskeletal:      Comments: Bilateral lower extremity weakness ambulate with a cane right foot turning inward   Skin:     General: Skin is warm and dry  Comments: Wet macerated skin between digits of his toes  Neurological:      Mental Status: He is alert and oriented to person, place, and time  Mental status is at baseline  Psychiatric:         Mood and Affect: Mood normal          Behavior: Behavior normal          Thought Content:  Thought content normal          Judgment: Judgment normal

## 2021-03-04 DIAGNOSIS — Z23 ENCOUNTER FOR IMMUNIZATION: ICD-10-CM

## 2021-04-02 ENCOUNTER — TELEPHONE (OUTPATIENT)
Dept: FAMILY MEDICINE CLINIC | Facility: CLINIC | Age: 71
End: 2021-04-02

## 2021-04-05 ENCOUNTER — CONSULT (OUTPATIENT)
Dept: FAMILY MEDICINE CLINIC | Facility: CLINIC | Age: 71
End: 2021-04-05
Payer: MEDICARE

## 2021-04-05 VITALS
BODY MASS INDEX: 36.2 KG/M2 | TEMPERATURE: 97.2 F | OXYGEN SATURATION: 97 % | WEIGHT: 244.4 LBS | RESPIRATION RATE: 16 BRPM | DIASTOLIC BLOOD PRESSURE: 62 MMHG | SYSTOLIC BLOOD PRESSURE: 116 MMHG | HEIGHT: 69 IN | HEART RATE: 75 BPM

## 2021-04-05 DIAGNOSIS — I48.91 ATRIAL FIBRILLATION, UNSPECIFIED TYPE (HCC): ICD-10-CM

## 2021-04-05 DIAGNOSIS — R73.03 PREDIABETES: ICD-10-CM

## 2021-04-05 DIAGNOSIS — K57.90 DIVERTICULOSIS: ICD-10-CM

## 2021-04-05 DIAGNOSIS — Z01.818 PRE-OPERATIVE CLEARANCE: Primary | ICD-10-CM

## 2021-04-05 DIAGNOSIS — R63.8 INCREASED BMI: ICD-10-CM

## 2021-04-05 DIAGNOSIS — E78.2 MIXED HYPERLIPIDEMIA: ICD-10-CM

## 2021-04-05 PROBLEM — R53.1 GENERALIZED WEAKNESS: Status: ACTIVE | Noted: 2020-10-04

## 2021-04-05 PROBLEM — K57.92 DIVERTICULITIS: Status: ACTIVE | Noted: 2017-12-29

## 2021-04-05 PROCEDURE — 99214 OFFICE O/P EST MOD 30 MIN: CPT | Performed by: NURSE PRACTITIONER

## 2021-04-05 NOTE — PROGRESS NOTES
BMI Counseling: Body mass index is 36 09 kg/m²  The BMI is above normal  Nutrition recommendations include decreasing portion sizes, encouraging healthy choices of fruits and vegetables, decreasing fast food intake, consuming healthier snacks, limiting drinks that contain sugar, moderation in carbohydrate intake, increasing intake of lean protein, reducing intake of saturated and trans fat and reducing intake of cholesterol  Exercise recommendations include exercising 3-5 times per week and strength training exercises  No pharmacotherapy was ordered  Patient referred to PCP due to patient being overweight   BMI 36 09     Discussed avoiding  NSAIDS or blood thinners aspirin prior to surgery   Hold vitamins for 1 week before and after         Assessment/Plan:    Presents in office for preoperative   Patient of Dr Chevy Haas   History of Post up atrial fibrillation after and valve replacement no issues since but has had underlying left bundle branch block   EKG done reviewed and sent to Cardiology for review   Labs ordered as he has not had any labs done in a long time   Prediabetes underlying   Hyperlipidemia   Has gait abnormality and ambulates with cane   Denies any shortness of breath or chest pain   No edema   BMI 36 09     Will review Labs once received and will await review of EKG by  Cardiology --> see if any recommendations or contraindications or they end if any   Will also discuss with Dr Chevy Haas     Discussed with CARDIOLOGY   PATIENT IS CLEARED AND GOOD TO GO        Problem List Items Addressed This Visit        Digestive    Diverticulosis    Relevant Orders    Comprehensive metabolic panel    CBC and differential    TSH, 3rd generation    UA (URINE) with reflex to Scope    HEMOGLOBIN A1C W/ EAG ESTIMATION    Lipid panel       Cardiovascular and Mediastinum    Atrial fibrillation ([facility] [address] )    Relevant Orders    Comprehensive metabolic panel    CBC and differential    TSH, 3rd generation    UA (URINE) with reflex to Scope HEMOGLOBIN A1C W/ EAG ESTIMATION    Lipid panel       Other    Prediabetes    Relevant Orders    Comprehensive metabolic panel    CBC and differential    TSH, 3rd generation    UA (URINE) with reflex to Scope    HEMOGLOBIN A1C W/ EAG ESTIMATION    Lipid panel      Other Visit Diagnoses     Pre-operative clearance    -  Primary    Relevant Orders    Comprehensive metabolic panel    CBC and differential    TSH, 3rd generation    UA (URINE) with reflex to Scope    HEMOGLOBIN A1C W/ EAG ESTIMATION    Lipid panel    Increased BMI        Relevant Orders    Comprehensive metabolic panel    CBC and differential    TSH, 3rd generation    UA (URINE) with reflex to Scope    HEMOGLOBIN A1C W/ EAG ESTIMATION    Lipid panel    Mixed hyperlipidemia        Relevant Orders    Comprehensive metabolic panel    CBC and differential    TSH, 3rd generation    UA (URINE) with reflex to Scope    HEMOGLOBIN A1C W/ EAG ESTIMATION    Lipid panel            Subjective:      Patient ID: Mackenzie Yuan is a 79 y o  male        Presents in office for preoperative clearacne   Patient of Dr Jarad Darling   History of Post up atrial fibrillation after and valve replacement no issues since but has had underlying left bundle branch block   EKG done reviewed and sent to Cardiology for review   Labs ordered as he has not had any labs done in a long time   Prediabetes underlying   Hyperlipidemia   Has gait abnormality and ambulates with cane   Denies any shortness of breath or chest pain   No edema   BMI 36 09     Will review Labs once received and will await review of EKG by  Cardiology --> see if any recommendations or contraindications or they end if any   Will also discuss with Dr Jarad Darling           The following portions of the patient's history were reviewed and updated as appropriate:   Past Medical History:  He has a past medical history of Ambulatory dysfunction (1/25/2021), Aortic stenosis, Aortic valve disorder, Atrial fibrillation ([address] ), Diverticulosis (1/25/2021), Functional urinary incontinence (1/25/2021), Hypertriglyceridemia (1/25/2021), Left bundle branch block (LBBB), Multiple sclerosis ([facility] ), Multiple sclerosis ([facility] ) (1/25/2021), and Prediabetes (1/25/2021)  ,  _______________________________________________________________________  Medical Problems:  does not have any pertinent problems on file ,  _______________________________________________________________________  Past Surgical History:   has a past surgical history that includes Aortic valve replacement (2001); Cervical spine surgery (02/2013); Colonoscopy (03/15/2018); Colonoscopy (06/10/2014); Aortic Valve Repair (04/2013); and Retinal laser procedure (02/2018)  ,  _______________________________________________________________________  Family History:  family history includes Heart Valve Disease in his family; Melanoma in his father; Other in his sister; Skin cancer in his family; Valvular heart disease in his mother ,  _______________________________________________________________________  Social History:   reports that he has never smoked  He has never used smokeless tobacco  He reports that he does not drink alcohol or use drugs  ,  _______________________________________________________________________  Allergies:  is allergic to simvastatin-high dose and statins     _______________________________________________________________________  Current Outpatient Medications   Medication Sig Dispense Refill    aspirin 81 MG tablet Take 1 tablet by mouth daily      B Complex Vitamins (VITAMIN B COMPLEX) TABS Take by mouth      clotrimazole (LOTRIMIN) 1 % cream Apply topically 2 (two) times a day Between digits of toes 60 g 2    finasteride (PROSCAR) 5 mg tablet Take 5 mg by mouth daily      methenamine hippurate (HIPREX) 1 g tablet Take by mouth TAKE 1/2 TAB EVERY 12 HOURS      metoprolol succinate (TOPROL-XL) 100 mg 24 hr tablet Take 1 tablet (100 mg total) by mouth daily 90 tablet 3    multivitamin (THERAGRAN) TABS Take 1 tablet by mouth daily      Omega-3 Fatty Acids (FISH OIL) 1200 MG CAPS Take by mouth      tamsulosin (FLOMAX) 0 4 mg Take 0 4 mg by mouth daily with dinner      Biotin 1 MG CAPS Take by mouth      Cholecalciferol 4000 units CAPS Take by mouth       No current facility-administered medications for this visit       _______________________________________________________________________  Review of Systems   Constitutional: Negative for chills and fatigue  HENT: Negative for congestion, sinus pain and sore throat  Eyes: Negative  Respiratory: Negative for cough and shortness of breath  Cardiovascular: Negative for chest pain and palpitations  History of valve replacement   Atrial fibrillation in the past denies any issues recently      Gastrointestinal: Positive for abdominal distention (softly )  Negative for abdominal pain, nausea and vomiting  Endocrine:        Prediabetes     Genitourinary: Negative for difficulty urinating and flank pain  Musculoskeletal: Positive for gait problem and myalgias  Negative for arthralgias  Gait abnormality    Allergic/Immunologic: Negative for environmental allergies  Neurological: Positive for weakness  Negative for headaches  Psychiatric/Behavioral: Negative for sleep disturbance and suicidal ideas  The patient is not nervous/anxious  Objective:  Vitals:    04/05/21 1445   BP: 116/62   BP Location: Left arm   Patient Position: Sitting   Cuff Size: Large   Pulse: 75   Resp: 16   Temp: (!) 97 2 °F (36 2 °C)   TempSrc: Temporal   SpO2: 97%   Weight: 111 kg (244 lb 6 4 oz)   Height: 5' 9" (1 753 m)     Body mass index is 36 09 kg/m²  Physical Exam  Vitals signs and nursing note reviewed  Constitutional:       Comments: BMI 36 09    HENT:      Head: Normocephalic and atraumatic     Eyes:      Comments: Preoperative clearance for retinal detachment   On Friday 4/9/2021    Neck:      Musculoskeletal: Neck supple  Cardiovascular:      Rate and Rhythm: Normal rate and regular rhythm  Pulses: Normal pulses  Comments: EKG done   Left bundle branch block   HR 75    ms   ms  P126 ms     Pulmonary:      Effort: Pulmonary effort is normal       Breath sounds: Normal breath sounds  Abdominal:      General: There is distension (softly distended )  Palpations: Abdomen is soft  Musculoskeletal: Normal range of motion  Skin:     Capillary Refill: Capillary refill takes less than 2 seconds  Neurological:      General: No focal deficit present  Mental Status: He is alert and oriented to person, place, and time  Psychiatric:         Mood and Affect: Mood normal          Behavior: Behavior normal        Contains abnormal data Lipid panel  Order: [id number]  Status:  Final result   Visible to patient:  Yes ([facility] My[facility])   Next appt:  06/23/2021 at 11:30 AM in Family Medicine (America Marquez MD)   Dx:  Mixed hyperlipidemia; Diverticulosis;    Ref Range & Units 4/6/21 12:58 PM   Cholesterol 50 - 200 mg/dL 204High     Comment: Cholesterol:       Desirable         <200 mg/dl       Borderline         200-239 mg/dl       High              >239          Triglycerides <=150 mg/dL 128    Comment: Triglyceride:      Normal          <150 mg/dl      Borderline High 150-199 mg/dl      High            200-499 mg/dl        Very High       >499 mg/dl     Specimen collection should occur prior to N-Acetylcysteine or Metamizole administration due to the potential for falsely depressed results  HDL, Direct >=40 mg/dL 45    Comment: HDL Cholesterol:       Low     <41 mg/dL   Specimen collection should occur prior to Metamizole administration due to the potential for falsley depressed results     LDL Calculated 0 - 100 mg/dL 133High     Comment: LDL Cholesterol:     Optimal           <100 mg/dl     Near Optimal      100-129 mg/dl     Above Optimal       Borderline High 130-159 mg/dl       High            160-189 mg/dl       Very High       >189 mg/dl                HEMOGLOBIN A1C W/ EAG ESTIMATION  Order: [id number]  Status:  Final result   Visible to patient:  Yes ([address])   Next appt:  06/23/2021 at 11:30 AM in Family Medicine (Edmundo Beard MD)   Dx:  Mixed hyperlipidemia; Diverticulosis;    Ref Range & Units 4/6/21 12:58 PM   Hemoglobin A1C Normal 3 8-5 6%; PreDiabetic 5 7-6 4%; Diabetic >=6 5%; Glycemic control for adults with diabetes <7 0% % 5 6    EAG mg/dl 114          Specimen Collected: 04/06/21 12:58 PM   Last Resulted: 04/06/21  3:20 PM           TSH, 3rd generation  Order: [id number]  Status:  Final result   Visible to patient:  Yes ([address])   Next appt:  06/23/2021 at 11:30 AM in Family Medicine (Edmundo Beard MD)   Dx:  Mixed hyperlipidemia; Diverticulosis;    Ref Range & Units 4/6/21 12:58 PM   TSH 3RD GENERATON 0 358 - 3 740 uIU/mL 1 060       Narrative          CBC and differential  Order: [id number]  Status:  Final result   Visible to patient:  Yes ([address])   Next appt:  06/23/2021 at 11:30 AM in Family Medicine (Edmundo Beard MD)   Dx:  Mixed hyperlipidemia; Diverticulosis;       Ref Range & Units 4/6/21 12:58 PM   WBC 4 31 - 10 16 Thousand/uL 8 65    RBC 3 88 - 5 62 Million/uL 5 13    Hemoglobin 12 0 - 17 0 g/dL 15 7    Hematocrit 36 5 - 49 3 % 47 4    MCV 82 - 98 fL 92    MCH 26 8 - 34 3 pg 30 6    MCHC 31 4 - 37 4 g/dL 33 1    RDW 11 6 - 15 1 % 13 1    MPV 8 9 - 12 7 fL 10 4    Platelets 923 - 632 Thousands/uL 262    nRBC /100 WBCs 0    Neutrophils Relative 43 - 75 % 54    Immat GRANS % 0 - 2 % 1    Lymphocytes Relative 14 - 44 % 30    Monocytes Relative 4 - 12 % 10    Eosinophils Relative 0 - 6 % 4    Basophils Relative 0 - 1 % 1    Neutrophils Absolute 1 85 - 7 62 Thousands/µL 4 80    Immature Grans Absolute 0 00 - 0 20 Thousand/uL 0 04    Lymphocytes Absolute 0 60 - 4 47 Thousands/µL 2 56    Monocytes Absolute 0 17 - 1 22 Thousand/µL 0 85 Eosinophils Absolute 0 00 - 0 61 Thousand/µL 0 33    Basophils Absolute 0 00 - 0 10 Thousands/µL 0 07          Specimen Collected: 04/06/21 12:58 PM   Last Resulted: 04/06/21  3:02 PM              Comprehensive metabolic panel  Order: [id number]  Status:  Final result   Visible to patient:  Yes ([address])   Next appt:  06/23/2021 at 11:30 AM in Family Medicine (Delmy Hayes MD)   Dx:  Mixed hyperlipidemia; Diverticulosis;    Ref Range & Units 4/6/21 12:58 PM   Sodium 136 - 145 mmol/L 137    Potassium 3 5 - 5 3 mmol/L 4 5    Chloride 100 - 108 mmol/L 105    CO2 21 - 32 mmol/L 27    ANION GAP 4 - 13 mmol/L 5    BUN 5 - 25 mg/dL 25    Creatinine 0 60 - 1 30 mg/dL 1 19    Comment: Standardized to IDMS reference method   Glucose, Fasting 65 - 99 mg/dL 96    Comment: Specimen collection should occur prior to Sulfasalazine administration due to the potential for falsely depressed results  Specimen collection should occur prior to Sulfapyridine administration due to the potential for falsely elevated results  Calcium 8 3 - 10 1 mg/dL 9 0    AST 5 - 45 U/L 22    Comment: Specimen collection should occur prior to Sulfasalazine administration due to the potential for falsely depressed results  ALT 12 - 78 U/L 35    Comment: Specimen collection should occur prior to Sulfasalazine and/or Sulfapyridine administration due to the potential for falsely depressed results  Alkaline Phosphatase 46 - 116 U/L 69    Total Protein 6 4 - 8 2 g/dL 7 2    Albumin 3 5 - 5 0 g/dL 4 3    Total Bilirubin 0 20 - 1 00 mg/dL 0 50    Comment: Use of this assay is not recommended for patients undergoing treatment with eltrombopag due to the potential for falsely elevated results     eGFR ml/min/1 73sq m 62       Narrative    National Kid[employer] Disease [employer] guidelines for Chronic Kidney Disease (CKD):     Stage 1 with normal or high GFR (GFR > 90 mL/min/1 73 square meters)     Stage 2 Mild CKD (GFR = 60-89 mL/min/1 73 square meters)     Stage 3A Moderate CKD (GFR = 45-59 mL/min/1 73 square meters)     Stage 3B Moderate CKD (GFR = 30-44 mL/min/1 73 square meters)     Stage 4 Severe CKD (GFR = 15-29 mL/min/1 73 square meters)     Stage 5 End Stage CKD (GFR <15 mL/min/1 73 square meters)   Note: GFR calculation is accurate only with a steady state creatinine      Specimen Collected: 04/06/21 12:58 PM   Last Resulted: 04/06/21  9:25 PM           UA w Reflex to Microscopic w Reflex to Culture  Order: [id number]  Status:  Final result   Visible to patient:  Yes ([facility] My[facility])   Next appt:  06/23/2021 at 11:30 AM in Family Medicine (Lyudmila Sidhu MD)   Dx:  Functional urinary incontinence  Specimen Information: Urine         Ref Range & Units 4/6/21 12:58 PM   Color, UA  Yellow    Clarity, UA  Clear    Specific [place], UA 1 003 - 1 030 1 019    pH, UA 4 5, 5 0, 5 5, 6 0, 6 5, 7 0, 7 5, 8 0 5 5    Leukocytes, UA Negative Negative    Nitrite, UA Negative Negative    Protein, UA Negative mg/dl Negative    Glucose, UA Negative mg/dl Negative    Ketones, UA Negative mg/dl Negative    Urobilinogen, UA 0 2, 1 0 E U /dl E U /dl 0 2    Bilirubin, UA Negative Negative    Blood, UA Negative Negative          Specimen Collected: 04/06/21 12:58 PM   Last Resulted: 04/06/21  8:11 PM

## 2021-04-05 NOTE — PATIENT INSTRUCTIONS
Medicine Management Before Surgery   AMBULATORY CARE:   Medicine management before surgery  means creating a plan with your healthcare providers to stop, start, or change your medicines  The plan can help prevent complications during and after surgery  The plan may also prevent your surgery from getting canceled or delayed  How to create a medicine management plan:   · Tell all of your healthcare providers about your surgery  Schedule appointments to see them before your surgery  You may need blood work or tests before surgery to help your healthcare provider make changes to your medicines  Some of your medicines may need to be stopped several days to weeks before surgery  Other medicines may be stopped the night before, or need to be taken the day of surgery  Do not stop taking your medicine until you talk to your healthcare providers  · Bring a list of all of your medicines, vitamins, and dietary supplements to your preoperative appointment and on the day of surgery  You may also bring your pill bottles  This will help your anesthesiologist plan your anesthesia and keep you safe during and after surgery  · If your healthcare provider tells you to take medicine on the day of surgery, take it in the morning with a sip of water  On the day of your surgery, tell healthcare providers what medicines you did or did not take that day  Contact your healthcare provider if:   · You have questions about when to stop taking your medicine before surgery  · You have questions about what medicines to take or not take on the day of your surgery  · You need a prescription refilled before surgery  Blood thinner and antiplatelet medicine: If you take blood thinners or antiplatelet medicines, you may be at risk for heavy bleeding during or after surgery  Ask your healthcare provider if you need to stop taking your medicine, and when to stop   After you have stopped your medicine for several days, you may need blood tests  Your surgery may be canceled or rescheduled if your blood tests are abnormal   · You may need to stop taking your blood thinner, such as warfarin, 5 to 7 days before your surgery  While you are not taking your blood thinner, you may need daily injections of heparin  Heparin may decrease your risk for a blood clot while you are not taking your blood thinner  You may need heparin injections once per day, starting the day that you stop your blood thinner  You may continue heparin injections until the day of your surgery  Talk to your healthcare provider about heparin injections  You may need a family member or friend to give you the heparin injection  · New oral anticoagulants (NOAC), such as rivaroxaban, may be stopped closer to the day of your surgery  Your healthcare provider may tell you to stop your NOAC 1 to 3 days before surgery  You will not need heparin injections or other medicines while you are not taking your NOAC  Talk to your healthcare provider before you stop taking your NOAC  · Your healthcare provider may tell you to stop taking antiplatelet medicine, such as aspirin, 5 to 7 days before surgery  You may instead need to continue taking your medicine until the day of your surgery  Talk to your healthcare provider about when to stop these medicines  Heart medicine: Take your heart medicine on the day of your surgery unless your healthcare provider tells you not to  Heart medicines may decrease your risk for complications during or after surgery  If you have had a heart attack or chest pain during exercise, you may need to see a cardiologist before surgery  You may also need to see a cardiologist if you have coronary artery disease  The cardiologist may change your medicines or start you on a heart medicine called a beta-blocker  A beta-blocker controls your heart rate, and may decrease your risk for heart problems during or after surgery     Blood pressure medicine:   · Take your blood pressure medicine on the day of surgery unless your healthcare provider tells you not to  Ask if you should take your diuretic, angiotensin-converting enzyme (ACE) inhibitor, or angiotensin receptor blocker (ARB) on the day of surgery  These medicines may need to be stopped 1 to 2 days before surgery  Diuretic medicines may cause your blood pressure to go too low during surgery  If you take more than one blood pressure medicine, ask which medicine to take on the day of your surgery  · You may need your blood pressure checked a few days before your surgery  This may help your healthcare provider make changes to your medicine and get you ready for surgery  If your blood pressure is not controlled before your surgery, the surgery may be canceled or delayed  Diabetes medicine:   · Keep a diary of your blood sugar levels for 2 weeks before your surgery  Bring your diary to your preoperative appointments  This will help your healthcare providers plan how to change, or when to stop, your diabetes medicine before surgery  · Talk to your healthcare provider about managing your diabetes medicine before surgery  You may be told to lower your dose of long-acting or intermediate-acting insulin the night before, or the morning of, your surgery  You may also be told to skip your dose of fast-acting insulin on the morning of your surgery  Do not take your oral diabetes medicine on the day of your surgery unless your healthcare provider says it is okay  Oral diabetes medicine may cause your blood sugar to drop too low during or after surgery  · If you use an insulin pump, ask what you should do the night before, and morning of, your surgery  Your healthcare provider may tell you to continue or lower your basal dose, and skip your bolus doses, on the day of surgery  Bring extra supplies for you insulin pump, such as cartridges, tubing, and needles   When you arrive for surgery, tell all healthcare providers that you use an insulin pump  They will need to know how your pump works, what your basal rate is, and how to make changes to your basal rate  Your anesthesiologist may disconnect your insulin pump and use IV insulin instead to control your blood sugars during surgery  · Check your blood sugar level on the morning before your surgery  Fasting may cause your blood sugar to be low  The stress of surgery may instead cause your blood sugar to be high  At your preoperative appointment ask your healthcare provider what to do on the morning of surgery if your blood sugar is high or low  Your blood sugar will be monitored closely before, during, and after surgery  This will prevent complications such as poor wound healing and infection  Antiseizure medicine: Take your antiseizure medicine on the morning of your surgery unless your healthcare provider tells you not to  Your healthcare provider may give you antiseizure medicine in your IV before or after surgery  Nonopioid pain medicine:  Stop taking your nonopioid pain medicine 2 days before surgery  Nonopioid pain medicine, such as NSAIDs, may increase your risk for bleeding during surgery  Other nonopioid medicine, such as gabapentin, may interfere with other medicines you may get during surgery  Steroids: Take your steroid medicine on the day of your surgery unless your healthcare provider tells you not to  Your healthcare provider may give you an extra, larger dose of IV steroids before or during your surgery to prevent low blood pressure  Hormones:  Take your thyroid medicine on the morning of your surgery unless your healthcare provider tells you not to  Ask your healthcare provider if you should take other hormone medicines on the day of surgery  Immunosuppressants: You may need to stop taking your immunosuppressants several days before, or the night before, your surgery   Immunosuppressants may increase your risk for wound infection and prevent wound healing  Talk to your healthcare provider about when to stop your immunosuppressants  Diet medicines:  Ask your healthcare provider when to stop taking diet medicine  Some diet medicines need to be stopped several days to weeks before surgery  Diet medicine may prevent other medicines from working or cause complications during surgery  Vitamins and herbal supplements:  Stop taking herbal supplements 1 week before surgery  Most vitamins can be taken up to the day before surgery  Ask your healthcare provider if you need to stop taking any vitamins before surgery  Some vitamins and herbal supplements may increase your risk for bleeding during surgery  They can also prevent anesthesia medicines from working right or increase your risk for a heart attack or stroke during surgery  Asthma or medicine for COPD:  Take your inhalers and asthma or COPD medicine on the morning of surgery  Bring your inhalers with you to your surgery  Anxiety, depression, or psychiatric medicine: Take your anxiety, depression, or psychiatric medicine on the morning of surgery, unless your healthcare provider tells you not to  Tell your anesthesiologist if you take a monoamine oxidase inhibitor (MAOI), such as phenelzine sulfate  To prevent complications, your anesthesiologist may need to change the type of anesthesia that you will get during surgery  Other medicines:  Ask your healthcare provider if and when you should stop take any other type of medicine before surgery  Follow up with your healthcare provider as directed:  Write down your questions so you remember to ask them during your visits  © Copyright [address] Information is for End User's use only and may not be sold, redistributed or otherwise used for commercial purposes  All illustrations and images included in CareNotes® are the copyrighted property of [employer]  or [facility]   The above information is an  only   It is not intended as medical advice for individual conditions or treatments  Talk to your doctor, nurse or pharmacist before following any medical regimen to see if it is safe and effective for you

## 2021-04-05 NOTE — Clinical Note
Please review and please advise if any recommendations or contraindications Did send you a copy of EKG   Thank you

## 2021-04-06 ENCOUNTER — APPOINTMENT (OUTPATIENT)
Dept: LAB | Facility: MEDICAL CENTER | Age: 71
End: 2021-04-06
Payer: MEDICARE

## 2021-04-06 DIAGNOSIS — R73.03 PREDIABETES: ICD-10-CM

## 2021-04-06 DIAGNOSIS — R63.8 INCREASED BMI: ICD-10-CM

## 2021-04-06 DIAGNOSIS — I48.91 ATRIAL FIBRILLATION, UNSPECIFIED TYPE (HCC): ICD-10-CM

## 2021-04-06 DIAGNOSIS — K57.90 DIVERTICULOSIS: ICD-10-CM

## 2021-04-06 DIAGNOSIS — Z01.818 PRE-OPERATIVE CLEARANCE: ICD-10-CM

## 2021-04-06 DIAGNOSIS — E78.2 MIXED HYPERLIPIDEMIA: ICD-10-CM

## 2021-04-06 LAB
ALBUMIN SERPL BCP-MCNC: 4.3 G/DL (ref 3.5–5)
ALP SERPL-CCNC: 69 U/L (ref 46–116)
ALT SERPL W P-5'-P-CCNC: 35 U/L (ref 12–78)
ANION GAP SERPL CALCULATED.3IONS-SCNC: 5 MMOL/L (ref 4–13)
AST SERPL W P-5'-P-CCNC: 22 U/L (ref 5–45)
BASOPHILS # BLD AUTO: 0.07 THOUSANDS/ΜL (ref 0–0.1)
BASOPHILS NFR BLD AUTO: 1 % (ref 0–1)
BILIRUB SERPL-MCNC: 0.5 MG/DL (ref 0.2–1)
BILIRUB UR QL STRIP: NEGATIVE
BUN SERPL-MCNC: 25 MG/DL (ref 5–25)
CALCIUM SERPL-MCNC: 9 MG/DL (ref 8.3–10.1)
CHLORIDE SERPL-SCNC: 105 MMOL/L (ref 100–108)
CHOLEST SERPL-MCNC: 204 MG/DL (ref 50–200)
CLARITY UR: CLEAR
CO2 SERPL-SCNC: 27 MMOL/L (ref 21–32)
COLOR UR: YELLOW
CREAT SERPL-MCNC: 1.19 MG/DL (ref 0.6–1.3)
EOSINOPHIL # BLD AUTO: 0.33 THOUSAND/ΜL (ref 0–0.61)
EOSINOPHIL NFR BLD AUTO: 4 % (ref 0–6)
ERYTHROCYTE [DISTWIDTH] IN BLOOD BY AUTOMATED COUNT: 13.1 % (ref 11.6–15.1)
EST. AVERAGE GLUCOSE BLD GHB EST-MCNC: 114 MG/DL
GFR SERPL CREATININE-BSD FRML MDRD: 62 ML/MIN/1.73SQ M
GLUCOSE P FAST SERPL-MCNC: 96 MG/DL (ref 65–99)
GLUCOSE UR STRIP-MCNC: NEGATIVE MG/DL
HBA1C MFR BLD: 5.6 %
HCT VFR BLD AUTO: 47.4 % (ref 36.5–49.3)
HDLC SERPL-MCNC: 45 MG/DL
HGB BLD-MCNC: 15.7 G/DL (ref 12–17)
HGB UR QL STRIP.AUTO: NEGATIVE
IMM GRANULOCYTES # BLD AUTO: 0.04 THOUSAND/UL (ref 0–0.2)
IMM GRANULOCYTES NFR BLD AUTO: 1 % (ref 0–2)
KETONES UR STRIP-MCNC: NEGATIVE MG/DL
LDLC SERPL CALC-MCNC: 133 MG/DL (ref 0–100)
LEUKOCYTE ESTERASE UR QL STRIP: NEGATIVE
LYMPHOCYTES # BLD AUTO: 2.56 THOUSANDS/ΜL (ref 0.6–4.47)
LYMPHOCYTES NFR BLD AUTO: 30 % (ref 14–44)
MCH RBC QN AUTO: 30.6 PG (ref 26.8–34.3)
MCHC RBC AUTO-ENTMCNC: 33.1 G/DL (ref 31.4–37.4)
MCV RBC AUTO: 92 FL (ref 82–98)
MONOCYTES # BLD AUTO: 0.85 THOUSAND/ΜL (ref 0.17–1.22)
MONOCYTES NFR BLD AUTO: 10 % (ref 4–12)
NEUTROPHILS # BLD AUTO: 4.8 THOUSANDS/ΜL (ref 1.85–7.62)
NEUTS SEG NFR BLD AUTO: 54 % (ref 43–75)
NITRITE UR QL STRIP: NEGATIVE
NONHDLC SERPL-MCNC: 159 MG/DL
NRBC BLD AUTO-RTO: 0 /100 WBCS
PH UR STRIP.AUTO: 5.5 [PH]
PLATELET # BLD AUTO: 262 THOUSANDS/UL (ref 149–390)
PMV BLD AUTO: 10.4 FL (ref 8.9–12.7)
POTASSIUM SERPL-SCNC: 4.5 MMOL/L (ref 3.5–5.3)
PROT SERPL-MCNC: 7.2 G/DL (ref 6.4–8.2)
PROT UR STRIP-MCNC: NEGATIVE MG/DL
RBC # BLD AUTO: 5.13 MILLION/UL (ref 3.88–5.62)
SODIUM SERPL-SCNC: 137 MMOL/L (ref 136–145)
SP GR UR STRIP.AUTO: 1.02 (ref 1–1.03)
TRIGL SERPL-MCNC: 128 MG/DL
TSH SERPL DL<=0.05 MIU/L-ACNC: 1.06 UIU/ML (ref 0.36–3.74)
UROBILINOGEN UR QL STRIP.AUTO: 0.2 E.U./DL
WBC # BLD AUTO: 8.65 THOUSAND/UL (ref 4.31–10.16)

## 2021-04-06 PROCEDURE — 83036 HEMOGLOBIN GLYCOSYLATED A1C: CPT

## 2021-04-06 PROCEDURE — 80061 LIPID PANEL: CPT

## 2021-04-06 PROCEDURE — 80053 COMPREHEN METABOLIC PANEL: CPT

## 2021-04-06 PROCEDURE — 36415 COLL VENOUS BLD VENIPUNCTURE: CPT

## 2021-04-06 PROCEDURE — 81003 URINALYSIS AUTO W/O SCOPE: CPT | Performed by: FAMILY MEDICINE

## 2021-04-06 PROCEDURE — 84443 ASSAY THYROID STIM HORMONE: CPT

## 2021-04-06 PROCEDURE — 85025 COMPLETE CBC W/AUTO DIFF WBC: CPT

## 2021-04-07 ENCOUNTER — OFFICE VISIT (OUTPATIENT)
Dept: CARDIOLOGY CLINIC | Facility: CLINIC | Age: 71
End: 2021-04-07
Payer: MEDICARE

## 2021-04-07 ENCOUNTER — TELEPHONE (OUTPATIENT)
Dept: FAMILY MEDICINE CLINIC | Facility: CLINIC | Age: 71
End: 2021-04-07

## 2021-04-07 VITALS
HEIGHT: 69 IN | DIASTOLIC BLOOD PRESSURE: 74 MMHG | WEIGHT: 243.5 LBS | BODY MASS INDEX: 36.07 KG/M2 | SYSTOLIC BLOOD PRESSURE: 130 MMHG | OXYGEN SATURATION: 94 % | HEART RATE: 70 BPM

## 2021-04-07 DIAGNOSIS — Z95.2 STATUS POST COMBINED AORTIC ROOT AND VALVE REPLACEMENT USING STENTLESS BIOPROSTHETIC AORTIC VALVE: ICD-10-CM

## 2021-04-07 DIAGNOSIS — Z95.4 STATUS POST COMBINED AORTIC ROOT AND VALVE REPLACEMENT USING STENTLESS BIOPROSTHETIC AORTIC VALVE: ICD-10-CM

## 2021-04-07 DIAGNOSIS — I10 HYPERTENSION, UNSPECIFIED TYPE: Primary | ICD-10-CM

## 2021-04-07 DIAGNOSIS — I44.7 LBBB (LEFT BUNDLE BRANCH BLOCK): ICD-10-CM

## 2021-04-07 PROCEDURE — 99214 OFFICE O/P EST MOD 30 MIN: CPT | Performed by: INTERNAL MEDICINE

## 2021-04-07 NOTE — TELEPHONE ENCOUNTER
As per DR [facility] will discuss with patient at 6- appointment     Akil Goncalves

## 2021-04-07 NOTE — TELEPHONE ENCOUNTER
----- Message from Carissa Fox MD sent at 4/7/2021 10:59 AM EDT -----  Will dw pt at visit 6/23, normal values

## 2021-04-07 NOTE — PROGRESS NOTES
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                Plan in discussion  Patient is here for clearance for eye surgery at [facility]  Aortic valve replacement in year 2000   Two thousand thirteen aortic root replaced and a Bovie in aortic tissue valve put in  It was April 2013   Short episode of atrial fibrillation postop  Stable left bundle branch  On beta-blocker  Coronary is normal at type of catheterization   Basal cell carcinoma in the past   Cervical disc surgery [facility] the past   No cardiac symptoms  Last echocardiogram 2020 with stable bioprosthetic valve and normal left her function  No heart failure or angina  He is cleared for his ocular surgery  HPI: Patricia Burks is a 79y o  year old male    HPI: Phong SERNA Mindy is Q 52 y o  year old male HPI: Phong SERNA Mindy is a 74 y  o  year old male History of Present Illness  Aortic root replacement with a Bovie aortic tissue valve April 10, 2013  He went home on amiodarone for one month  No further episodes of atrial fibrillation or flutter  His initial surgery was mechanical aortic valve replacement the year 2000  At that time he was quoted as a unicuspid valve  He has multiple sclerosis and is under therapy at this time  Holter counter in may of 2013 showed bradycardia  He is off the amiodarone  At that time he was on amiodarone and beta blocker  He is presently just on Metroprolol 100 daily    During his heart catheterization his coronaries were normal   He's had C4-C5 anterior cervical discectomy and fusion at [facility]  He has a history of colon polyps  His a history of basal cell carcinoma 3 separate occasions  There is a family history of melanoma  He has no issues with his cardiac system at the present time  Echocardiogram done June of 2013  Mild left ventricular hypertrophy with ejection fraction of 60%  There is left atrial enlargement  The proximal aorta 3 9 cm     Patient seen 11 6 2014  As noted above he has a low been aortic tissue valve put in April 2013  His aortic root has been repaired at that time as well  He also has multiple sclerosis, he had postoperative atrial fibrillation, no atrial fibrillation since his surgery, he is off amiodarone, his coronaries are normal, he has cervical discectomy and fusion at [facility] in the past,  Recently had an episode of near syncope  His is followed by diaphoresis  [facility]  Monitor for 4 days  No rigidity is noted  In addition he had an event recorder past months  No evidence of atrial fib  Last echocardiogram June 2014 shows left ventricle normal  Prosthetic valve is functioning well  There are no cardiac issues  His multiple sclerosis seems to be progressive he is going to [address]     Patient seen November 10, 2015  He had a Bovie and aortic valve and aortic root replacement April 2013  First mechanical valves in year 2000  He has had no further atrial fibrillation  The new issue for him if she has developed a first-degree AV block and a left bundle-branch block pattern  He's had no syncope or near-syncope  His multiple sclerosis unfortunately has progressive  He an echocardiogram in June  His aortic valve is functioning well  Plan an echocardiogram in one year and a Holter before that time  He'll report any syncope      Patient seen November 1, 2016  He has second bioprosthetic valve  Aortic root  2013  He's had no further atrial fibrillation  Echocardiogram shows a 12 mm gradient  His primary issue has multiple sclerosis under therapy   Cardiac standpoint he has been very stable     Patient seen November 21, 2017  Echocardiogram shows a very mild paravalvular leak  This is his 2nd valve in the aortic root was repaired  It was done in 2013  HPI    Plan patient seen November 19, 2019  In the year 2000 aortic valve replacement  In 2000 13 he had a aortic root replaced and a Bovie in aortic tissue valve put in   This was April 10, 2013  He had atrial fibrillation had 1 month treatment with amiodarone  He has a left bundle branch block pattern  He is on beta-blocker  Coronaries are normal in the past catheterization  He had a cervical diskectomy in [facility]  Is basal cell carcinomas removed  Holter counter November 2018 with his left bundle shows no high-grade block  LDL is 105  Creatinine is 1 3  Last echocardiogram November 2018 shows ejection fraction 55% with LVH  Bovie in valve is fine  Just plan on echocardiogram in a year  He is being treated for multiple sclerosis                      Review of Systems   Constitutional: Negative  Respiratory: Negative  Cardiovascular: Negative  Musculoskeletal:        Gait disability secondary to multiple sclerosis under therapy at [facility]         Past Medical History:   Diagnosis Date    Ambulatory dysfunction 1/25/2021    Aortic stenosis     Aortic valve disorder     Atrial fibrillation ([facility] [address] )     Diverticulosis 1/25/2021    Functional urinary incontinence 1/25/2021    Hypertriglyceridemia 1/25/2021    Left bundle branch block (LBBB)     Multiple sclerosis (HCC)     Multiple sclerosis ([facility] [address] ) 1/25/2021    Prediabetes 1/25/2021     Social History     Substance and Sexual Activity   Alcohol Use No     Social History     Substance and Sexual Activity   Drug Use Never     Social History     Tobacco Use   Smoking Status Never Smoker   Smokeless Tobacco Never Used   Tobacco Comment    Former Smoker per [employer]       Allergies:   Allergies   Allergen Reactions    Simvastatin-High Dose     Statins Other (See Comments) CPK levels elevated       Medications:     Current Outpatient Medications:     aspirin 81 MG tablet, Take 1 tablet by mouth daily, Disp: , Rfl:     Cholecalciferol 4000 units CAPS, Take by mouth, Disp: , Rfl:     finasteride (PROSCAR) 5 mg tablet, Take 5 mg by mouth daily, Disp: , Rfl:     methenamine hippurate (HIPREX) 1 g tablet, Take by mouth TAKE 1/2 TAB EVERY 12 HOURS, Disp: , Rfl:     metoprolol succinate (TOPROL-XL) 100 mg 24 hr tablet, Take 1 tablet (100 mg total) by mouth daily, Disp: 90 tablet, Rfl: 3    multivitamin (THERAGRAN) TABS, Take 1 tablet by mouth daily, Disp: , Rfl:     Omega-3 Fatty Acids (FISH OIL) 1200 MG CAPS, Take by mouth, Disp: , Rfl:     tamsulosin (FLOMAX) 0 4 mg, Take 0 4 mg by mouth daily with dinner, Disp: , Rfl:     B Complex Vitamins (VITAMIN B COMPLEX) TABS, Take by mouth, Disp: , Rfl:     Biotin 1 MG CAPS, Take by mouth, Disp: , Rfl:     clotrimazole (LOTRIMIN) 1 % cream, Apply topically 2 (two) times a day Between digits of toes, Disp: 60 g, Rfl: 2      Physical Exam  Constitutional:       Appearance: He is obese  Cardiovascular:      Rate and Rhythm: Normal rate and regular rhythm  Pulses: Normal pulses  Heart sounds: Normal heart sounds  No murmur  No friction rub  No gallop  Pulmonary:      Effort: Pulmonary effort is normal  No respiratory distress  Breath sounds: Normal breath sounds  No stridor  No wheezing, rhonchi or rales  Chest:      Chest wall: No tenderness  Musculoskeletal:      Right lower leg: No edema  Left lower leg: No edema  Skin:     General: Skin is warm and dry  Neurological:      Mental Status: He is alert     Psychiatric:         Behavior: Behavior normal            Laboratory Studies:  CMP:  Results from last 7 days   Lab Units 04/06/21  1258   POTASSIUM mmol/L 4 5   CHLORIDE mmol/L 105   CO2 mmol/L 27   BUN mg/dL 25   CREATININE mg/dL 1 19   CALCIUM mg/dL 9 0   AST U/L 22   ALT U/L 35   ALK PHOS U/L 69   EGFR ml/min/1 73sq m 62     NT-proBNP:   Lab Results   Component Value Date    NTBNP 45 2018      Coags:    Lipid Profile:   Lab Results   Component Value Date    CHOL 175 2014     Lab Results   Component Value Date    HDL 45 2021     Lab Results   Component Value Date    LDLCALC 133 (H) 2021     Lab Results   Component Value Date    TRIG 128 2021       Cardiac testing:     EKG reviewed personally:   EKG on done at his physician's office reviewed  Stable left bundle  Results for orders placed during the hospital encounter of 10/19/20   Echo complete with contrast if indicated    Narrative [address]  [facility], [address] [facility]  [phone]    Transthoracic Echocardiogram  2D, M-mode, Doppler, and Color Doppler    Study date:  19-Oct-2020    Patient: Love Chandler  MR number: [id number]  Account number: [de-identified]  : 1950  Age: 71 years  Gender: Male  Status: Outpatient  Location: [address] [place] [address] [facility] [place]  Height: 70 in  Weight: 239 6 lb  BP: 138/ 76 mmHg    Indications: Aortic valve replacement    Diagnoses: I10  - Essential (primary) hypertension, I44 7 - Left bundle-branch block, unspecified    Sonographer:  Manuela Best BS, RDCS, RVT, RDMS  Primary Physician:  Al Lord MD  Referring Physician:  Jhoana Carrasquillo MD  Group:  [id number]  Cardiology Fellow: Olesya Soriano MD  Interpreting Physician:  Jhoana Carrasquillo MD    SUMMARY    LEFT VENTRICLE:  Systolic function was normal  Ejection fraction was estimated to be 55 %  There were no regional wall motion abnormalities  There was mild concentric hypertrophy  Features were consistent with a pseudonormal left ventricular filling pattern, with concomitant abnormal relaxation and increased filling pressure (grade 2 diastolic dysfunction)  VENTRICULAR SEPTUM:  There was "bounce" motion   These changes are consistent with a post-thoracotomy state  RIGHT VENTRICLE:  The size was normal   Systolic function was normal     MITRAL VALVE:  There was no significant perivalvular regurgitation  AORTIC VALVE:  A bioprosthesis was present  It exhibited trace central regurgitation, no paravalvular leak and otherwise normal function  Valve mean gradient was 10 8 mmHg  Estimated aortic valve area (by VTI) was 1 67 cmï¾²  TRICUSPID VALVE:  There was mild regurgitation  Estimated peak PA pressure was 35 mmHg  The findings suggest mild pulmonary hypertension  AORTA:  The root exhibited mild dilatation at 3 8 cm  COMPARISONS:  There has been no significant interval change  Comparison was made with the previous study of 12-Nov-2018  HISTORY: PRIOR HISTORY: LBBB, bioprosthetic AVR (for AS) with aortic root replacement 2013, HTN  PROCEDURE: The study was performed in the [address] [facility]  This was a routine study  The transthoracic approach was used  The study included complete 2D imaging, M-mode, complete spectral Doppler, and color Doppler  Images were obtained from the parasternal, apical, subcostal, and suprasternal notch acoustic windows  Image quality was good  LEFT VENTRICLE: Size was normal  Systolic function was normal  Ejection fraction was estimated to be 55 %  There were no regional wall motion abnormalities  Wall thickness was mildly increased  There was mild concentric hypertrophy  DOPPLER: Features were consistent with a pseudonormal left ventricular filling pattern, with concomitant abnormal relaxation and increased filling pressure (grade 2 diastolic dysfunction)  VENTRICULAR SEPTUM: There was "bounce" motion  These changes are consistent with a post-thoracotomy state  RIGHT VENTRICLE: The size was normal  Systolic function was normal  The moderator band was in a normal position and normal-sized  LEFT ATRIUM: The atrium was mildly dilated      RIGHT ATRIUM: The atrium was mildly dilated  MITRAL VALVE: Valve structure was normal  There was normal leaflet separation  DOPPLER: The transmitral velocity was within the normal range  There was no evidence for stenosis  There was trace regurgitation  Regurgitation grade was less  than 1+ on a scale of 0 to 4+  There was no significant perivalvular regurgitation  The regurgitant jet was centrally directed  AORTIC VALVE: A bioprosthesis was present  It exhibited trace central regurgitation, no paravalvular leak and otherwise normal function  TRICUSPID VALVE: The valve structure was normal  There was normal leaflet separation  DOPPLER: The transtricuspid velocity was within the normal range  There was no evidence for stenosis  There was mild regurgitation  Estimated peak PA  pressure was 35 mmHg  The findings suggest mild pulmonary hypertension  PULMONIC VALVE: Leaflets exhibited normal thickness, no calcification, and normal cuspal separation  DOPPLER: The transpulmonic velocity was within the normal range  There was no evidence for stenosis  There was mild regurgitation  PERICARDIUM: There was no pericardial effusion  The pericardium was normal in appearance  AORTA: The root exhibited mild dilatation at 3 8 cm  SYSTEMIC VEINS: IVC: The inferior vena cava was normal in size and course   Respirophasic changes were normal     MEASUREMENT TABLES    2D MEASUREMENTS  LVOT   (Reference normals)  Diam   24 mm   (--)    DOPPLER MEASUREMENTS  LVOT   (Reference normals)  Peak joey   77 cm/s   (--)  Mean joey   52 cm/s   (--)  VTI   18 7 cm   (--)  Peak gradient   2 mmHg   (--)  Mean gradient   1 2 mmHg   (--)  Stroke vol   84 6 ml   (--)  Aortic valve   (Reference normals)  Peak joey   217 cm/s   (--)  Mean joey   154 cm/s   (--)  VTI   50 7 cm   (--)  Peak gradient   18 8 mmHg   (--)  Mean gradient   10 8 mmHg   (--)  Obstr index, VTI   0 37    (--)  Valve area, VTI   1 67 cmï¾²   (--)  Obstr index, Vmax   0 35    (--)  Valve area, Vmax   1 58 cmï¾²   (--)  Obstr index, Vmean   0 34    (--)  Valve area, Vmean   1 54 cmï¾²   (--)    SYSTEM MEASUREMENT TABLES    2D  %FS: 25 64 %  EDV(Teich): 129 97 ml  EF(Teich): 50 13 %  ESV(Teich): 64 82 ml  IVSd: 1 25 cm  LA Area: 20 68 cm2  LVEDV MOD A4C: 149 82 ml  LVEF MOD A4C: 52 65 %  LVESV MOD A4C: 70 94 ml  LVIDd: 5 21 cm  LVIDs: 3 87 cm  LVLd A4C: 9 05 cm  LVLs A4C: 7 39 cm  LVOT Diam: 2 4 cm  LVPWd: 1 07 cm  RA Area: 18 93 cm2  RVIDd: 3 96 cm  SV MOD A4C: 78 88 ml  SV(Teich): 65 15 ml    CW  AV Env  Ti: 329 84 ms  AV VTI: 50 72 cm  AV Vmax: 2 21 m/s  AV Vmean: 1 54 m/s  AV maxP 76 mmHg  AV meanPG: 10 76 mmHg  MV PHT: 111 52 ms  MV VTI: 46 17 cm  MV Vmax: 1 26 m/s  MV Vmean: 0 64 m/s  MV maxP 34 mmHg  MV meanP mmHg  MVA By PHT: 1 97 cm2  TR Vmax: 2 76 m/s  TR maxP 39 mmHg    MM  Ao Diam: 3 9 cm  LA Diam: 5 48 cm  LA/Ao: 1 4  TAPSE: 2 12 cm    PW  ZIGGY (VTI): 1 67 cm2  ZIGGY Vmax: 1 57 cm2  AVAI (VTI): 0 cm2/m2  AVAI Vmax: 0 cm2/m2  E' Lat: 0 1 m/s  E/E' Lat: 10 67  LVOT Env  Ti: 362 51 ms  LVOT VTI: 18 68 cm  LVOT Vmax: 0 77 m/s  LVOT Vmean: 0 52 m/s  LVOT maxP 35 mmHg  LVOT meanP 24 mmHg  LVSI Dopp: 37 43 ml/m2  LVSV Dopp: 84 59 ml  MV A Quinton: 1 06 m/s  MV Dec Page: 3 15 m/s2  MV DecT: 338 34 ms  MV E [place]: 1 07 m/s  MV E/A Ratio: 1  MVA (VTI): 1 83 cm2    [employer] Accredited Echocardiography Laboratory    Prepared and electronically signed by    Sarwat Lloyd MD  Signed 19-Oct-2020 13:36:01       No results found for this or any previous visit  No results found for this or any previous visit  No results found for this or any previous visit  Sarwat Lloyd MD    Portions of the record may have been created with voice recognition software   Occasional wrong word or "sound a like" substitutions may have occurred due to the inherent limitations of voice recognition software   Read the chart carefully and recognize, using context, where substitutions have occurred

## 2021-06-21 ENCOUNTER — APPOINTMENT (OUTPATIENT)
Dept: LAB | Facility: MEDICAL CENTER | Age: 71
End: 2021-06-21
Payer: MEDICARE

## 2021-06-21 DIAGNOSIS — Z11.59 ENCOUNTER FOR SCREENING FOR OTHER VIRAL DISEASES: ICD-10-CM

## 2021-06-21 DIAGNOSIS — E51.9 THIAMIN DEFICIENCY: ICD-10-CM

## 2021-06-21 DIAGNOSIS — E53.8 CYANOCOBALAMIN DEFICIENCY: ICD-10-CM

## 2021-06-21 DIAGNOSIS — E51.9 THIAMINE DEFICIENCY: ICD-10-CM

## 2021-06-21 DIAGNOSIS — E53.1 PYRIDOXINE DEFICIENCY: ICD-10-CM

## 2021-06-21 DIAGNOSIS — E78.1 HYPERTRIGLYCERIDEMIA: ICD-10-CM

## 2021-06-21 DIAGNOSIS — E54 ASCORBIC ACID DEFICIENCY: ICD-10-CM

## 2021-06-21 DIAGNOSIS — E55.9 VITAMIN D DEFICIENCY: ICD-10-CM

## 2021-06-21 DIAGNOSIS — Z11.59 NEED FOR HEPATITIS C SCREENING TEST: ICD-10-CM

## 2021-06-21 DIAGNOSIS — R39.81 FUNCTIONAL URINARY INCONTINENCE: ICD-10-CM

## 2021-06-21 DIAGNOSIS — R73.03 PREDIABETES: ICD-10-CM

## 2021-06-21 DIAGNOSIS — Z28.39 IMMUNIZATION DEFICIENCY: ICD-10-CM

## 2021-06-21 LAB
25(OH)D3 SERPL-MCNC: 55.7 NG/ML (ref 30–100)
ALBUMIN SERPL BCP-MCNC: 3.7 G/DL (ref 3.5–5)
ALP SERPL-CCNC: 77 U/L (ref 46–116)
ALT SERPL W P-5'-P-CCNC: 32 U/L (ref 12–78)
ANION GAP SERPL CALCULATED.3IONS-SCNC: 6 MMOL/L (ref 4–13)
AST SERPL W P-5'-P-CCNC: 23 U/L (ref 5–45)
BASOPHILS # BLD AUTO: 0.08 THOUSANDS/ΜL (ref 0–0.1)
BASOPHILS NFR BLD AUTO: 1 % (ref 0–1)
BILIRUB SERPL-MCNC: 0.73 MG/DL (ref 0.2–1)
BUN SERPL-MCNC: 17 MG/DL (ref 5–25)
CALCIUM SERPL-MCNC: 8.9 MG/DL (ref 8.3–10.1)
CHLORIDE SERPL-SCNC: 106 MMOL/L (ref 100–108)
CHOLEST SERPL-MCNC: 188 MG/DL (ref 50–200)
CO2 SERPL-SCNC: 26 MMOL/L (ref 21–32)
CREAT SERPL-MCNC: 1.03 MG/DL (ref 0.6–1.3)
EOSINOPHIL # BLD AUTO: 0.19 THOUSAND/ΜL (ref 0–0.61)
EOSINOPHIL NFR BLD AUTO: 2 % (ref 0–6)
ERYTHROCYTE [DISTWIDTH] IN BLOOD BY AUTOMATED COUNT: 13.1 % (ref 11.6–15.1)
GFR SERPL CREATININE-BSD FRML MDRD: 73 ML/MIN/1.73SQ M
GLUCOSE P FAST SERPL-MCNC: 97 MG/DL (ref 65–99)
HAV AB SER QL IA: NORMAL
HBV SURFACE AB SER-ACNC: <3.1 MIU/ML
HCT VFR BLD AUTO: 47.1 % (ref 36.5–49.3)
HDLC SERPL-MCNC: 44 MG/DL
HGB BLD-MCNC: 15.9 G/DL (ref 12–17)
IMM GRANULOCYTES # BLD AUTO: 0.02 THOUSAND/UL (ref 0–0.2)
IMM GRANULOCYTES NFR BLD AUTO: 0 % (ref 0–2)
LDLC SERPL CALC-MCNC: 115 MG/DL (ref 0–100)
LYMPHOCYTES # BLD AUTO: 2.07 THOUSANDS/ΜL (ref 0.6–4.47)
LYMPHOCYTES NFR BLD AUTO: 23 % (ref 14–44)
MCH RBC QN AUTO: 31.2 PG (ref 26.8–34.3)
MCHC RBC AUTO-ENTMCNC: 33.8 G/DL (ref 31.4–37.4)
MCV RBC AUTO: 92 FL (ref 82–98)
MONOCYTES # BLD AUTO: 0.71 THOUSAND/ΜL (ref 0.17–1.22)
MONOCYTES NFR BLD AUTO: 8 % (ref 4–12)
NEUTROPHILS # BLD AUTO: 5.78 THOUSANDS/ΜL (ref 1.85–7.62)
NEUTS SEG NFR BLD AUTO: 66 % (ref 43–75)
NRBC BLD AUTO-RTO: 0 /100 WBCS
PLATELET # BLD AUTO: 254 THOUSANDS/UL (ref 149–390)
PMV BLD AUTO: 9.9 FL (ref 8.9–12.7)
POTASSIUM SERPL-SCNC: 4.2 MMOL/L (ref 3.5–5.3)
PROT SERPL-MCNC: 7.1 G/DL (ref 6.4–8.2)
RBC # BLD AUTO: 5.1 MILLION/UL (ref 3.88–5.62)
RUBV IGG SERPL IA-ACNC: >175 IU/ML
SODIUM SERPL-SCNC: 138 MMOL/L (ref 136–145)
TRIGL SERPL-MCNC: 145 MG/DL
TSH SERPL DL<=0.05 MIU/L-ACNC: 0.98 UIU/ML (ref 0.36–3.74)
VIT B12 SERPL-MCNC: 392 PG/ML (ref 100–900)
WBC # BLD AUTO: 8.85 THOUSAND/UL (ref 4.31–10.16)

## 2021-06-21 PROCEDURE — 84443 ASSAY THYROID STIM HORMONE: CPT

## 2021-06-21 PROCEDURE — 86735 MUMPS ANTIBODY: CPT

## 2021-06-21 PROCEDURE — 83516 IMMUNOASSAY NONANTIBODY: CPT

## 2021-06-21 PROCEDURE — 83036 HEMOGLOBIN GLYCOSYLATED A1C: CPT

## 2021-06-21 PROCEDURE — 80053 COMPREHEN METABOLIC PANEL: CPT

## 2021-06-21 PROCEDURE — 86762 RUBELLA ANTIBODY: CPT

## 2021-06-21 PROCEDURE — 82607 VITAMIN B-12: CPT

## 2021-06-21 PROCEDURE — 84207 ASSAY OF VITAMIN B-6: CPT

## 2021-06-21 PROCEDURE — 84425 ASSAY OF VITAMIN B-1: CPT

## 2021-06-21 PROCEDURE — 85025 COMPLETE CBC W/AUTO DIFF WBC: CPT

## 2021-06-21 PROCEDURE — 36415 COLL VENOUS BLD VENIPUNCTURE: CPT

## 2021-06-21 PROCEDURE — 82180 ASSAY OF ASCORBIC ACID: CPT

## 2021-06-21 PROCEDURE — 82306 VITAMIN D 25 HYDROXY: CPT

## 2021-06-21 PROCEDURE — 86708 HEPATITIS A ANTIBODY: CPT

## 2021-06-21 PROCEDURE — 80061 LIPID PANEL: CPT

## 2021-06-21 PROCEDURE — 86765 RUBEOLA ANTIBODY: CPT

## 2021-06-21 PROCEDURE — 86706 HEP B SURFACE ANTIBODY: CPT

## 2021-06-22 ENCOUNTER — APPOINTMENT (OUTPATIENT)
Dept: LAB | Facility: MEDICAL CENTER | Age: 71
End: 2021-06-22
Payer: MEDICARE

## 2021-06-22 DIAGNOSIS — E53.8 CYANOCOBALAMIN DEFICIENCY: ICD-10-CM

## 2021-06-22 DIAGNOSIS — E66.01 OBESITY, MORBID (HCC): ICD-10-CM

## 2021-06-22 LAB
CORTIS AM PEAK SERPL-MCNC: 15.2 UG/DL (ref 4.2–22.4)
EST. AVERAGE GLUCOSE BLD GHB EST-MCNC: 111 MG/DL
HBA1C MFR BLD: 5.5 %
MEV IGG SER QL: NORMAL
MUV IGG SER QL: NORMAL

## 2021-06-22 PROCEDURE — 36415 COLL VENOUS BLD VENIPUNCTURE: CPT

## 2021-06-22 PROCEDURE — 82533 TOTAL CORTISOL: CPT

## 2021-06-22 PROCEDURE — 86803 HEPATITIS C AB TEST: CPT

## 2021-06-22 PROCEDURE — 86340 INTRINSIC FACTOR ANTIBODY: CPT

## 2021-06-23 ENCOUNTER — OFFICE VISIT (OUTPATIENT)
Dept: FAMILY MEDICINE CLINIC | Facility: CLINIC | Age: 71
End: 2021-06-23
Payer: MEDICARE

## 2021-06-23 VITALS
TEMPERATURE: 98.5 F | WEIGHT: 241.2 LBS | OXYGEN SATURATION: 97 % | HEART RATE: 65 BPM | DIASTOLIC BLOOD PRESSURE: 80 MMHG | BODY MASS INDEX: 35.73 KG/M2 | SYSTOLIC BLOOD PRESSURE: 122 MMHG | HEIGHT: 69 IN

## 2021-06-23 DIAGNOSIS — I35.9 AORTIC VALVE DISORDER: ICD-10-CM

## 2021-06-23 DIAGNOSIS — R73.03 PREDIABETES: ICD-10-CM

## 2021-06-23 DIAGNOSIS — E66.01 OBESITY, MORBID (HCC): ICD-10-CM

## 2021-06-23 DIAGNOSIS — Z23 NEED FOR HEPATITIS A AND B VACCINATION: ICD-10-CM

## 2021-06-23 DIAGNOSIS — G35 MULTIPLE SCLEROSIS (HCC): ICD-10-CM

## 2021-06-23 DIAGNOSIS — R26.2 AMBULATORY DYSFUNCTION: ICD-10-CM

## 2021-06-23 DIAGNOSIS — I71.2 THORACIC AORTIC ANEURYSM WITHOUT RUPTURE (HCC): ICD-10-CM

## 2021-06-23 DIAGNOSIS — E78.1 HYPERTRIGLYCERIDEMIA: ICD-10-CM

## 2021-06-23 DIAGNOSIS — R39.81 FUNCTIONAL URINARY INCONTINENCE: ICD-10-CM

## 2021-06-23 DIAGNOSIS — Z00.00 MEDICARE ANNUAL WELLNESS VISIT, SUBSEQUENT: ICD-10-CM

## 2021-06-23 DIAGNOSIS — L60.9 NAIL DISORDER: ICD-10-CM

## 2021-06-23 DIAGNOSIS — E55.9 VITAMIN D DEFICIENCY: ICD-10-CM

## 2021-06-23 DIAGNOSIS — Z13.6 SCREENING FOR AAA (ABDOMINAL AORTIC ANEURYSM): Primary | ICD-10-CM

## 2021-06-23 DIAGNOSIS — E53.8 CYANOCOBALAMIN DEFICIENCY: ICD-10-CM

## 2021-06-23 LAB
HCV AB S/CO SERPL IA: <0.1 S/CO RATIO (ref 0–0.9)
SL AMB INTERPRETATION: NORMAL

## 2021-06-23 PROCEDURE — 99214 OFFICE O/P EST MOD 30 MIN: CPT | Performed by: FAMILY MEDICINE

## 2021-06-23 PROCEDURE — G0439 PPPS, SUBSEQ VISIT: HCPCS | Performed by: FAMILY MEDICINE

## 2021-06-23 PROCEDURE — 1123F ACP DISCUSS/DSCN MKR DOCD: CPT | Performed by: FAMILY MEDICINE

## 2021-06-23 RX ORDER — NITROFURANTOIN MACROCRYSTALS 50 MG/1
CAPSULE ORAL
COMMUNITY
Start: 2021-06-17 | End: 2022-07-22

## 2021-06-23 RX ORDER — CETIRIZINE HYDROCHLORIDE 10 MG/1
10 TABLET ORAL DAILY
COMMUNITY

## 2021-06-23 RX ORDER — AMOXICILLIN 500 MG/1
CAPSULE ORAL
COMMUNITY
Start: 2021-05-10 | End: 2022-07-22

## 2021-06-23 RX ORDER — NITROFURANTOIN 25; 75 MG/1; MG/1
CAPSULE ORAL
COMMUNITY
Start: 2021-06-11 | End: 2022-07-22

## 2021-06-23 RX ORDER — LEVOFLOXACIN 750 MG/1
TABLET ORAL
COMMUNITY
Start: 2021-06-15 | End: 2022-07-22

## 2021-06-23 RX ORDER — POLYMYXIN B SULFATE AND TRIMETHOPRIM 1; 10000 MG/ML; [USP'U]/ML
SOLUTION OPHTHALMIC
COMMUNITY
Start: 2021-04-09 | End: 2022-07-22

## 2021-06-23 RX ORDER — PREDNISOLONE ACETATE 10 MG/ML
SUSPENSION/ DROPS OPHTHALMIC
COMMUNITY
Start: 2021-05-15

## 2021-06-23 NOTE — PROGRESS NOTES
Assessment and Plan:     Problem List Items Addressed This Visit        Cardiovascular and Mediastinum    Aortic valve disorder    Thoracic aortic aneurysm without rupture (HCC)       Nervous and Auditory    Multiple sclerosis (HCC)       Musculoskeletal and Integument    Nail disorder       Other    Prediabetes    Relevant Orders    Hemoglobin A1C    CBC and differential    Functional urinary incontinence    Ambulatory dysfunction    Hypertriglyceridemia    Relevant Orders    Comprehensive metabolic panel    Obesity, morbid (HCC)    Vitamin D deficiency    Need for hepatitis A and B vaccination      Other Visit Diagnoses     Screening for AAA (abdominal aortic aneurysm)    -  Primary    Relevant Orders    US abdominal aorta screening aaa    Cyanocobalamin deficiency        Relevant Orders    Vitamin B12    Anti-parietal antibody    H  pylori antigen, stool    Intrinsic factor blocking antibody    Celiac Disease Panel           Preventive health issues were discussed with patient, and age appropriate screening tests were ordered as noted in patient's After Visit Summary  Personalized health advice and appropriate referrals for health education or preventive services given if needed, as noted in patient's After Visit Summary       History of Present Illness:     Patient presents for Medicare Annual Wellness visit    Patient Care Team:  Rafi Hobbs MD as PCP - General (Family Medicine)  MD Sima Fairbanks MD     Problem List:     Patient Active Problem List   Diagnosis    Multiple sclerosis ([address] )    Left bundle branch block (LBBB)    Atrial fibrillation ([address] )    Aortic valve disorder    Aortic stenosis    Prediabetes    Functional urinary incontinence    Diverticulosis    Ambulatory dysfunction    Hypertriglyceridemia    Thoracic aortic aneurysm without rupture ([address] )    Obesity, morbid ([address] )    Vitamin D deficiency    Memory loss    Heart valve replaced    Generalized weakness    Diverticulitis    Depression    Abnormal gait    Nail disorder    Need for hepatitis A and B vaccination      Past Medical and Surgical History:     Past Medical History:   Diagnosis Date    Ambulatory dysfunction 1/25/2021    Aortic stenosis     Aortic valve disorder     Atrial fibrillation (HCC)     Cancer ([facility] 2000    Skin-Basal cell    Coronary artery disease Aortic valve 2000    Disease of thyroid gland Nodules    See Dr Chaz Proctor Diverticulosis 1/25/2021    Functional urinary incontinence 1/25/2021    Heart murmur Congenetal    Surgectly repaired    Hypertension 2000    Take Metoprolol    Hypertriglyceridemia 1/25/2021    Left bundle branch block (LBBB)     Multiple sclerosis ([facility] )     Multiple sclerosis ([facility] ) 1/25/2021    Nail disorder 6/23/2021    Need for hepatitis A and B vaccination 6/23/2021    Obesity 2015    Prediabetes 1/25/2021    Urinary tract infection 2017    See Dr [facility]     Past Surgical History:   Procedure Laterality Date    AORTIC VALVE REPAIR  04/2013    ascending and transverse aorta repair per Rey Beal    AORTIC VALVE REPLACEMENT  2001    mechanical repair per [address]   [place] [address] VALVE REPLACEMENT  [id number]    Valve and Aortic root    CERVICAL SPINE SURGERY  02/2013    C4-C5 anterior cervical discectomy and fusion per [facility]    COLONOSCOPY  03/15/2018    3 years    COLONOSCOPY  06/10/2014    5 years    Maria Esther Jolly LYMPH NODE BIOPSY  2016    RETINAL LASER PROCEDURE  02/2018    tear repair per Shani Henry SPINE SURGERY  2012    C4=5 Fusion [facility]      Family History:     Family History   Problem Relation Age of Onset    Skin cancer Family     Heart Valve Disease Family     Valvular heart disease Mother         mitral valve disorder    Melanoma Father     Cancer Father         Melanoma    Other Sister         cerebral meningioma      Social History:     Social History     Socioeconomic History    Marital status: /Civil Union     Spouse name: None    Number of children: None    Years of education: None    Highest education level: None   Occupational History    None   Tobacco Use    Smoking status: Former Smoker     Packs/day: 1 00     Years: 25 00     Pack years: 25 00     Types: Cigarettes     Start date: 10/22/1968     Quit date: 1/10/2000     Years since quittin 4    Smokeless tobacco: Never Used    Tobacco comment: Former Smoker per [employer] Vaping Use: Never used   Substance and Sexual Activity    Alcohol use: Yes     Alcohol/week: 5 0 standard drinks     Types: 2 Glasses of wine, 2 Cans of beer, 1 Standard drinks or equivalent per week    Drug use: Never    Sexual activity: Not Currently     Partners: Female     Birth control/protection: None   Other Topics Concern    None   Social History Narrative    · Most recent tobacco use screenin2019      · Do you currently or have you served in the [employer]:   No      · Advance directive:   Yes     Per [place]     Social Determinants of Health     Financial Resource Strain:     Difficulty of Paying Living Expenses:    Food Insecurity:     Worried About Running Out of Food in the Last Year:     [address] in the Last Year:    Transportation Needs:     Lack of Transportation (Medical):      Lack of Transportation (Non-Medical):    Physical Activity:     Days of Exercise per Week:     Minutes of Exercise per Session:    Stress:     Feeling of Stress :    Social Connections:     Frequency of Communication with Friends and Family:     Frequency of Social Gatherings with Friends and Family:     Attends Orthodox Services:     Active Member of Clubs or Organizations:     Attends Club or Organization Meetings:     Marital Status:    Intimate Partner Violence:     Fear of Current or Ex-Partner:     Emotionally Abused:     Physically Abused:     Sexually Abused:       Medications and Allergies: Current Outpatient Medications   Medication Sig Dispense Refill    amoxicillin (AMOXIL) 500 mg capsule       aspirin 81 MG tablet Take 1 tablet by mouth daily      B Complex Vitamins (VITAMIN B COMPLEX) TABS Take by mouth      cetirizine (ZyrTEC) 10 mg tablet Take 10 mg by mouth daily      Cholecalciferol 4000 units CAPS Take by mouth      clotrimazole (LOTRIMIN) 1 % cream Apply topically 2 (two) times a day Between digits of toes 60 g 2    finasteride (PROSCAR) 5 mg tablet Take 5 mg by mouth daily      levofloxacin (LEVAQUIN) 750 mg tablet       methenamine hippurate (HIPREX) 1 g tablet Take by mouth TAKE 1/2 TAB EVERY 12 HOURS      metoprolol succinate (TOPROL-XL) 100 mg 24 hr tablet Take 1 tablet (100 mg total) by mouth daily 90 tablet 3    multivitamin (THERAGRAN) TABS Take 1 tablet by mouth daily      Omega-3 Fatty Acids (FISH OIL) 1200 MG CAPS Take by mouth      prednisoLONE acetate (PRED FORTE) 1 % ophthalmic suspension INSTILL 1 DROP INTO LEFT EYE 4 TIMES A DAY      tamsulosin (FLOMAX) 0 4 mg Take 0 4 mg by mouth daily with dinner      Biotin 1 MG CAPS Take by mouth (Patient not taking: Reported on 6/23/2021)      nitrofurantoin (MACROBID) 100 mg capsule  (Patient not taking: Reported on 6/23/2021)      nitrofurantoin (MACRODANTIN) 50 mg capsule  (Patient not taking: Reported on 6/23/2021)      polymyxin b-trimethoprim (POLYTRIM) ophthalmic solution INSTILL 1 DROP INTO LEFT EYE 4 TIMES A DAY FOR 7 DAYS (Patient not taking: Reported on 6/23/2021)       No current facility-administered medications for this visit       Allergies   Allergen Reactions    Simvastatin-High Dose     Statins Other (See Comments)     CPK levels elevated      Immunizations:     Immunization History   Administered Date(s) Administered    INFLUENZA 03/20/2014    Influenza Split High Dose Preservative Free IM 11/01/2016, 10/10/2017    Influenza, high dose seasonal 0 7 mL 10/06/2020    SARS-CoV-2 / COVID-19 mRNA IM (Moderna) 03/12/2021    Tdap 02/08/2012    Zoster Vaccine Recombinant 03/06/2020, 06/23/2020      Health Maintenance:         Topic Date Due    Colorectal Cancer Screening  03/15/2021    Hepatitis C Screening  Completed         Topic Date Due    Pneumococcal Vaccine: 65+ Years (1 of 1 - PPSV23) Never done    COVID-19 Vaccine (2 - Moderna 2-dose series) 04/09/2021      Medicare Health Risk Assessment:     /80 (BP Location: Left arm, Patient Position: Sitting, Cuff Size: Standard)   Pulse 65   Temp 98 5 °F (36 9 °C) (Tympanic)   Ht 5' 9" (1 753 m)   Wt 109 kg (241 lb 3 2 oz)   SpO2 97%   BMI 35 62 kg/m²      Diogo Bermeo is here for his Subsequent Wellness visit  Last Medicare Wellness visit information reviewed, patient interviewed and updates made to the record today  Health Risk Assessment:   Patient rates overall health as fair  Patient feels that their physical health rating is slightly worse  Patient is dissatisfied with their life  Eyesight was rated as slightly worse  Hearing was rated as same  Patient feels that their emotional and mental health rating is same  Patients states they are sometimes angry  Patient states they are often unusually tired/fatigued  Pain experienced in the last 7 days has been some  Patient's pain rating has been 5/10  Patient states that he has experienced no weight loss or gain in last 6 months  Depression Screening:   PHQ-2 Score: 1  PHQ-9 Score: 3      Fall Risk Screening: In the past year, patient has experienced: no history of falling in past year      Home Safety:  Patient does not have trouble with stairs inside or outside of their home  Patient has working smoke alarms and has working carbon monoxide detector  Home safety hazards include: none  Nutrition:   Current diet is Regular  Medications:   Patient is currently taking over-the-counter supplements  OTC medications include: see medication list  Patient is able to manage medications  Activities of Daily Living (ADLs)/Instrumental Activities of Daily Living (IADLs):   Walk and transfer into and out of bed and chair?: Yes  Dress and groom yourself?: Yes    Bathe or shower yourself?: Yes    Feed yourself? Yes  Do your laundry/housekeeping?: Yes  Manage your money, pay your bills and track your expenses?: Yes  Make your own meals?: Yes    Do your own shopping?: Yes    Previous Hospitalizations:   Any hospitalizations or ED visits within the last 12 months?: No      Advance Care Planning:   Living will: Yes    Durable POA for healthcare:  Yes    Advanced directive: Yes      Cognitive Screening:   Provider or family/friend/caregiver concerned regarding cognition?: No    PREVENTIVE SCREENINGS      Cardiovascular Screening:    General: History Lipid Disorder, Risks and Benefits Discussed and Screening Current      Diabetes Screening:     General: Screening Current and Risks and Benefits Discussed      Colorectal Cancer Screening:     General: Screening Current      Prostate Cancer Screening:    General: Risks and Benefits Discussed and Screening Current      Osteoporosis Screening:    General: Risks and Benefits Discussed, Screening Current and Screening Not Indicated      Abdominal Aortic Aneurysm (AAA) Screening:    Risk factors include: age between 73-69 yo and tobacco use        General: Risks and Benefits Discussed    Due for: Screening AAA Ultrasound      Lung Cancer Screening:     General: Screening Not Indicated and Risks and Benefits Discussed      Hepatitis C Screening:    General: Screening Current and Risks and Benefits Discussed    Hep C Screening Accepted: Yes      Screening, Brief Intervention, and Referral to Treatment (SBIRT)    Screening    Typical number of drinks in a week: 4    Single Item Drug Screening:  How often have you used an illegal drug (including marijuana) or a prescription medication for non-medical reasons in the past year? never    Single Item Drug Screen Score: 0  Interpretation: Negative screen for possible drug use disorder    Brief Intervention  Healthy alcohol use/limits discussed  Other Counseling Topics:   Car/seat belt/driving safety, skin self-exam, sunscreen and regular weightbearing exercise and calcium and vitamin D intake         Delmy Hayes MD

## 2021-06-23 NOTE — PROGRESS NOTES
Assessment/Plan:    Patient recommend to start vitamin B12 replacement  Will monitor his sugar and his cholesterol there are normal right now  Order for abdominal aortic aneurysm screening  Advised to check insurance for hep a and hep B vaccine coverage  Will see him back in 6 m  Need nails clipping for cm    Pt is cleared for upcoming planned surgery vitrectomy left eye on 7/21/2021 for mac anesthesia  I have spent 25 minutes with Patient  today in which greater than 50% of this time was spent in counseling/coordination of care regarding Diagnostic results, Prognosis and Risks and benefits of tx options  Problem List Items Addressed This Visit        Cardiovascular and Mediastinum    Aortic valve disorder    Thoracic aortic aneurysm without rupture (HCC)       Nervous and Auditory    Multiple sclerosis (HCC)       Musculoskeletal and Integument    Nail disorder       Other    Prediabetes    Relevant Orders    Hemoglobin A1C    CBC and differential    Functional urinary incontinence    Ambulatory dysfunction    Hypertriglyceridemia    Relevant Orders    Comprehensive metabolic panel    Obesity, morbid ([facility] [address] )    Vitamin D deficiency    Need for hepatitis A and B vaccination      Other Visit Diagnoses     Screening for AAA (abdominal aortic aneurysm)    -  Primary    Relevant Orders    US abdominal aorta screening aaa    Cyanocobalamin deficiency        Relevant Orders    Vitamin B12    Anti-parietal antibody    H  pylori antigen, stool    Intrinsic factor blocking antibody    Celiac Disease Panel            Subjective:      Patient ID: Lamar Leon is a 79 y o  male  70-year-old male follow-up high blood pressure multiple sclerosis urinary incontinent due to muscle multiple sclerosis  Also follow-up blood test results  His vitamin B12 is low 392 vitamin-D is good at 55 LDL is good at 51 15  He is not immune to hep a and hep B infection  Hep C is negative    CBC CMP thyroid normal   Urine is normal for infection  He is currently on nitro fear in 20 for 30 days from urologist   He quit smoking the past 20 years  Up-to-date with colonoscopy  He had thoracic aneurysm that had repair  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 ), Cancer ([facility] [address] ) (2000), Coronary artery disease (Aortic valve 2000), Disease of thyroid gland (Nodules), Diverticulosis (1/25/2021), Functional urinary incontinence (1/25/2021), Heart murmur (Congenetal), Hypertension (2000), Hypertriglyceridemia (1/25/2021), Left bundle branch block (LBBB), Multiple sclerosis ([facility] [address] ), Multiple sclerosis ([facility] ) (1/25/2021), Nail disorder (6/23/2021), Need for hepatitis A and B vaccination (6/23/2021), Obesity (2015), Prediabetes (1/25/2021), and Urinary tract infection (2017)  ,  _______________________________________________________________________  Medical Problems:  does not have any pertinent problems on file ,  _______________________________________________________________________  Past Surgical History:   has a past surgical history that includes Aortic valve replacement (2001); Cervical spine surgery (02/2013); Colonoscopy (03/15/2018); Colonoscopy (06/10/2014); Aortic Valve Repair (04/2013); Retinal laser procedure (02/2018); Cardiac surgery (8929,1498); Lymph node biopsy (2016); Spine surgery (2012); and Cardiac valve replacement (7091,6746)  ,  _______________________________________________________________________  Family History:  family history includes Cancer in his father; Heart Valve Disease in his family; Melanoma in his father;  Other in his sister; Skin cancer in his family; Valvular heart disease in his mother ,  _______________________________________________________________________  Social History:   reports that he quit smoking about 21 years ago  His smoking use included cigarettes  He started smoking about 52 years ago  He has a 25 00 pack-year smoking history  He has never used smokeless tobacco  He reports current alcohol use of about 5 0 standard drinks of alcohol per week  He reports that he does not use drugs  ,  _______________________________________________________________________  Allergies:  is allergic to simvastatin-high dose and statins     _______________________________________________________________________  Current Outpatient Medications   Medication Sig Dispense Refill    amoxicillin (AMOXIL) 500 mg capsule       aspirin 81 MG tablet Take 1 tablet by mouth daily      B Complex Vitamins (VITAMIN B COMPLEX) TABS Take by mouth      cetirizine (ZyrTEC) 10 mg tablet Take 10 mg by mouth daily      Cholecalciferol 4000 units CAPS Take by mouth      clotrimazole (LOTRIMIN) 1 % cream Apply topically 2 (two) times a day Between digits of toes 60 g 2    finasteride (PROSCAR) 5 mg tablet Take 5 mg by mouth daily      levofloxacin (LEVAQUIN) 750 mg tablet       methenamine hippurate (HIPREX) 1 g tablet Take by mouth TAKE 1/2 TAB EVERY 12 HOURS      metoprolol succinate (TOPROL-XL) 100 mg 24 hr tablet Take 1 tablet (100 mg total) by mouth daily 90 tablet 3    multivitamin (THERAGRAN) TABS Take 1 tablet by mouth daily      Omega-3 Fatty Acids (FISH OIL) 1200 MG CAPS Take by mouth      prednisoLONE acetate (PRED FORTE) 1 % ophthalmic suspension INSTILL 1 DROP INTO LEFT EYE 4 TIMES A DAY      tamsulosin (FLOMAX) 0 4 mg Take 0 4 mg by mouth daily with dinner      Biotin 1 MG CAPS Take by mouth (Patient not taking: Reported on 6/23/2021)      nitrofurantoin (MACROBID) 100 mg capsule  (Patient not taking: Reported on 6/23/2021)      nitrofurantoin (MACRODANTIN) 50 mg capsule  (Patient not taking: Reported on 6/23/2021)      polymyxin b-trimethoprim (POLYTRIM) ophthalmic solution INSTILL 1 DROP INTO LEFT EYE 4 TIMES A DAY FOR 7 DAYS (Patient not taking: Reported on 6/23/2021)       No current facility-administered medications for this visit      _______________________________________________________________________  Review of Systems   Constitutional: Negative for activity change, appetite change, fatigue, fever and unexpected weight change  HENT: Negative for dental problem and trouble swallowing  Eyes: Negative for photophobia and visual disturbance  Respiratory: Negative for cough and chest tightness  Cardiovascular: Negative for chest pain, palpitations and leg swelling  Gastrointestinal: Negative for abdominal pain, constipation and vomiting  Endocrine: Negative for cold intolerance, polydipsia and polyuria  Genitourinary: Negative for difficulty urinating, frequency and urgency  Musculoskeletal: Negative for arthralgias, joint swelling, myalgias and neck pain  Skin: Negative for color change, rash and wound  Allergic/Immunologic: Negative for environmental allergies  Neurological: Positive for weakness and numbness  Negative for dizziness  Hematological: Does not bruise/bleed easily  Psychiatric/Behavioral: Negative for decreased concentration, dysphoric mood, self-injury, sleep disturbance and suicidal ideas  Objective:  Vitals:    06/23/21 1154   BP: 122/80   BP Location: Left arm   Patient Position: Sitting   Cuff Size: Standard   Pulse: 65   Temp: 98 5 °F (36 9 °C)   TempSrc: Tympanic   SpO2: 97%   Weight: 109 kg (241 lb 3 2 oz)   Height: 5' 9" (1 753 m)     Body mass index is 35 62 kg/m²  Physical Exam  Vitals and nursing note reviewed  Constitutional:       Appearance: Normal appearance  He is obese        Comments: Ambulate with a cane spastic gait   Pulmonary:      Effort: Pulmonary effort is normal    Neurological:      Mental Status: He is alert and oriented to person, place, and time  Psychiatric:         Mood and Affect: Mood normal          Behavior: Behavior normal          Thought Content:  Thought content normal          Judgment: Judgment normal

## 2021-06-23 NOTE — PATIENT INSTRUCTIONS

## 2021-06-24 LAB — VIT C SERPL-MCNC: 0.4 MG/DL (ref 0.4–2)

## 2021-06-25 LAB
PCA AB SER-ACNC: 1.3 UNITS (ref 0–20)
VIT B6 SERPL-MCNC: 7.9 UG/L (ref 5.3–46.7)

## 2021-06-28 LAB — IF BLOCK AB SER QL RIA: 1.1 AU/ML (ref 0–1.1)

## 2021-07-09 ENCOUNTER — TELEPHONE (OUTPATIENT)
Dept: FAMILY MEDICINE CLINIC | Facility: CLINIC | Age: 71
End: 2021-07-09

## 2021-07-09 NOTE — TELEPHONE ENCOUNTER
Called and left message on patient voicemail results of labs  Any questions patient is to call office    Ana Lilia Augustin

## 2021-07-09 NOTE — TELEPHONE ENCOUNTER
----- Message from Ramiro Ballard MD sent at 7/8/2021  6:58 PM EDT -----  Please let pt know the rest of blood work show his vitamin c and b6 are low   Recommend to take vitamin c 1000 mg daily, vitamin b6 100 mg daily

## 2021-07-14 ENCOUNTER — TELEPHONE (OUTPATIENT)
Dept: FAMILY MEDICINE CLINIC | Facility: CLINIC | Age: 71
End: 2021-07-14

## 2021-07-14 NOTE — TELEPHONE ENCOUNTER
Azalea Glynn from [facility] called - pt is having 2nd retina surgery - clearances were done in April - can that note be addend to cover this surgery as well     Please contact Azalea Glynn with questions - [phone] ext [phone]

## 2021-07-15 NOTE — TELEPHONE ENCOUNTER
Is there a form they can fax so I can fill out? performance of any surgical or medical procedure(s). I am aware that the practice of surgery and medicine is not an exact science, and I acknowledge that no guarantees have been made to me concerning the results of the procedure(s). 5) I consent to the taking of photographs before, during and after the procedure(s) for scientific and educational purposes. I also understand that medical students and residents may participate in the procedure(s) set forth in Paragraph 1, and I consent to their participation under the supervision of the above named physician. 6) I consent to the administration of anesthesia and to the use of such anesthetics as may be deemed advisable by the anesthesiologist engaged to administer anesthesia. 7) I certify that I have read and understand this consent to the surgical or medical procedure(s); that all the information contained herein was disclosed to me by the informing physician prior to my signing; that all blanks or statements requiring insertions or completion were filled in and inapplicable paragraphs, if any, were stricken before I signed; and that all questions asked by me about the procedure(s) have been fully answered by the informing physician in a satisfactory manner.     Would you like to schedule an appointment with Dr. Adan Carr prior to surgery:   YES  / NO  ______Initial    ________________________________                           _______________________________  Signature of patient                                                                  Witness           Bill Gagnon PA-C / RAMAKRISHNA Sung M.D.   ________________________________                           ________________________________  Informing Physician Assistant                                                                 Physician (Print)    If patient is unable to sign or is a minor, complete one of the following:

## 2021-07-15 NOTE — TELEPHONE ENCOUNTER
She said we can either addend the last visit or fill out a  form  I had her fax the form over just in case

## 2021-07-15 NOTE — TELEPHONE ENCOUNTER
Called Patricia back and left a message asking for more information regarding his surgery such as the date and the name of the surgeon

## 2021-09-20 ENCOUNTER — OFFICE VISIT (OUTPATIENT)
Dept: CARDIOLOGY CLINIC | Facility: CLINIC | Age: 71
End: 2021-09-20
Payer: MEDICARE

## 2021-09-20 VITALS
HEIGHT: 69 IN | HEART RATE: 71 BPM | BODY MASS INDEX: 34.85 KG/M2 | OXYGEN SATURATION: 96 % | DIASTOLIC BLOOD PRESSURE: 78 MMHG | SYSTOLIC BLOOD PRESSURE: 140 MMHG | WEIGHT: 235.3 LBS

## 2021-09-20 DIAGNOSIS — Z01.810 PREOP CARDIOVASCULAR EXAM: Primary | ICD-10-CM

## 2021-09-20 DIAGNOSIS — Z95.4 STATUS POST COMBINED AORTIC ROOT AND VALVE REPLACEMENT USING STENTLESS BIOPROSTHETIC AORTIC VALVE: ICD-10-CM

## 2021-09-20 DIAGNOSIS — Z95.2 STATUS POST COMBINED AORTIC ROOT AND VALVE REPLACEMENT USING STENTLESS BIOPROSTHETIC AORTIC VALVE: ICD-10-CM

## 2021-09-20 DIAGNOSIS — I44.7 LEFT BUNDLE BRANCH BLOCK (LBBB): ICD-10-CM

## 2021-09-20 DIAGNOSIS — E78.1 HYPERTRIGLYCERIDEMIA: ICD-10-CM

## 2021-09-20 DIAGNOSIS — I35.9 AORTIC VALVE DISORDER: ICD-10-CM

## 2021-09-20 DIAGNOSIS — I10 HYPERTENSION, UNSPECIFIED TYPE: ICD-10-CM

## 2021-09-20 PROCEDURE — 93000 ELECTROCARDIOGRAM COMPLETE: CPT | Performed by: INTERNAL MEDICINE

## 2021-09-20 PROCEDURE — 99214 OFFICE O/P EST MOD 30 MIN: CPT | Performed by: INTERNAL MEDICINE

## 2021-09-20 NOTE — PROGRESS NOTES
Follow-up - Cardiology   [facility] Eladia Hanford 79 y o  male MRN: [id number]        Problems    Problem List Items Addressed This Visit        Cardiovascular and Mediastinum    Left bundle branch block (LBBB)    Aortic valve disorder       Other    Hypertriglyceridemia      Other Visit Diagnoses     Preop cardiovascular exam    -  Primary    Relevant Orders    POCT ECG    Hypertension, unspecified type        Status post combined aortic root and valve replacement using stentless bioprosthetic aortic valve                Plan discussion  He is here for clearance for 2nd surgery who will side  His been no change since I last saw him  From a cardiac standpoint he has been rock stable  He had aortic valve replacement year 2000 and and 2013 he had a Bovie an aortic tissue of valve put in as well as aortic root replacement  He has had no atrial fibrillation since his surgery  He has a stable left bundle  His coronaries are normal time of catheterization  She also had basal cell carcinoma in the past S2 well as cervical disc surgery  His major problem in his life is been multiple sclerosis he has had for many years  This is [facility]  I did clear him  He is clear from a cardiac standpoint          HPI: Maribell Orellana is a 79y o  year old male    HPI: Maribell Orellana is a 79y o  year old male    HPI: Phong SERNA Mindy is S 57 y o  year old male HPI: Phong SERNA Hanford is a 74 y  o  year old male History of Present Illness  Aortic root replacement with a Bovie aortic tissue valve April 10, 2013  He went home on amiodarone for one month  No further episodes of atrial fibrillation or flutter  His initial surgery was mechanical aortic valve replacement the year 2000  At that time he was quoted as a unicuspid valve  He has multiple sclerosis and is under therapy at this time  Holter counter in may of 2013 showed bradycardia  He is off the amiodarone   At that time he was on amiodarone and beta blocker  He is presently just on Metroprolol 100 daily  During his heart catheterization his coronaries were normal   He's had C4-C5 anterior cervical discectomy and fusion at [facility]  He has a history of colon polyps  His a history of basal cell carcinoma 3 separate occasions  There is a family history of melanoma  He has no issues with his cardiac system at the present time  Echocardiogram done June of 2013  Mild left ventricular hypertrophy with ejection fraction of 60%  There is left atrial enlargement  The proximal aorta 3 9 cm     Patient seen 11 6 2014  As noted above he has a low been aortic tissue valve put in April 2013  His aortic root has been repaired at that time as well  He also has multiple sclerosis, he had postoperative atrial fibrillation, no atrial fibrillation since his surgery, he is off amiodarone, his coronaries are normal, he has cervical discectomy and fusion at [facility] in the past,  Recently had an episode of near syncope  His is followed by diaphoresis  [email or web address]  Monitor for 4 days  No rigidity is noted  In addition he had an event recorder past months  No evidence of atrial fib  Last echocardiogram June 2014 shows left ventricle normal  Prosthetic valve is functioning well  There are no cardiac issues  His multiple sclerosis seems to be progressive he is going to [address]     Patient seen November 10, 2015  He had a Bovie and aortic valve and aortic root replacement April 2013  First mechanical valves in year 2000  He has had no further atrial fibrillation  The new issue for him if she has developed a first-degree AV block and a left bundle-branch block pattern  He's had no syncope or near-syncope  His multiple sclerosis unfortunately has progressive  He an echocardiogram in June  His aortic valve is functioning well  Plan an echocardiogram in one year and a Holter before that time   He'll report any syncope      Patient seen November 1, 2016  He has second bioprosthetic valve  Aortic root  2013  He's had no further atrial fibrillation  Echocardiogram shows a 12 mm gradient  His primary issue has multiple sclerosis under therapy  Cardiac standpoint he has been very stable     Patient seen November 21, 2017  Echocardiogram shows a very mild paravalvular leak  This is his 2nd valve in the aortic root was repaired  It was done in 2013      HPI    Plan patient seen November 19, 2019  In the year 2000 aortic valve replacement  In 2000 13 he had a aortic root replaced and a Bovie in aortic tissue valve put in   This was April 10, 2013  He had atrial fibrillation had 1 month treatment with amiodarone  He has a left bundle branch block pattern  He is on beta-blocker  Coronaries are normal in the past catheterization  He had a cervical diskectomy in [facility]  Is basal cell carcinomas removed  Holter counter November 2018 with his left bundle shows no high-grade block  LDL is 105  Creatinine is 1 3  Last echocardiogram November 2018 shows ejection fraction 55% with LVH  Bovie in valve is fine  Just plan on echocardiogram in a year  He is being treated for multiple sclerosis                   Plan in discussion  Patient is here for clearance for eye surgery at [facility]  Aortic valve replacement in year 2000   Two thousand thirteen aortic root replaced and a Bovie in aortic tissue valve put in  It was April 2013   Short episode of atrial fibrillation postop  Stable left bundle branch  On beta-blocker  Coronary is normal at type of catheterization   Basal cell carcinoma in the past   Cervical disc surgery [facility] the past   No cardiac symptoms  Last echocardiogram 2020 with stable bioprosthetic valve and normal left her function  No heart failure or angina  He is cleared for his ocular surgery             Review of Systems   Constitutional: Positive for fatigue  Respiratory: Negative  Cardiovascular: Negative  Musculoskeletal: Positive for back pain and gait problem  Multiple sclerosis for many years  Neurological: Positive for weakness  Ravages of multiple sclerosis   Hematological: Negative  Psychiatric/Behavioral: Negative  Past Medical History:   Diagnosis Date    Ambulatory dysfunction 2021    Aortic stenosis     Aortic valve disorder     Atrial fibrillation (HCC)     Cancer ([facility] [address] )     Skin-Basal cell    Coronary artery disease Aortic valve 2000    Disease of thyroid gland Nodules    See Dr Glendy Andrea Diverticulosis 2021    Functional urinary incontinence 2021    Heart murmur Congenetal    Surgectly repaired    Hypertension 2000    Take Metoprolol    Hypertriglyceridemia 2021    Left bundle branch block (LBBB)     Multiple sclerosis ([facility] )     Multiple sclerosis ([facility] ) 2021    Nail disorder 2021    Need for hepatitis A and B vaccination 2021    Obesity 2015    Prediabetes 2021    Urinary tract infection 2017    See Dr Branden Stanley     Social History     Substance and Sexual Activity   Alcohol Use Yes    Alcohol/week: 5 0 standard drinks    Types: 2 Glasses of wine, 2 Cans of beer, 1 Standard drinks or equivalent per week     Social History     Substance and Sexual Activity   Drug Use Never     Social History     Tobacco Use   Smoking Status Former Smoker    Packs/day: 1 00    Years: 25 00    Pack years: 25 00    Types: Cigarettes    Start date: 10/22/1968   Le[place] January Quit date: 1/10/2000    Years since quittin 7   Smokeless Tobacco Never Used   Tobacco Comment    Former Smoker per Farhat Boles       Allergies:   Allergies   Allergen Reactions    Simvastatin-High Dose     Statins Other (See Comments)     CPK levels elevated       Medications:     Current Outpatient Medications:     amoxicillin (AMOXIL) 500 mg capsule, , Disp: , Rfl:     aspirin 81 MG tablet, Take 1 tablet by mouth daily, Disp: , Rfl:     B Complex Vitamins (VITAMIN B COMPLEX) TABS, Take by mouth, Disp: , Rfl:     cetirizine (ZyrTEC) 10 mg tablet, Take 10 mg by mouth daily, Disp: , Rfl:     Cholecalciferol 4000 units CAPS, Take by mouth, Disp: , Rfl:     clotrimazole (LOTRIMIN) 1 % cream, Apply topically 2 (two) times a day Between digits of toes, Disp: 60 g, Rfl: 2    finasteride (PROSCAR) 5 mg tablet, Take 5 mg by mouth daily, Disp: , Rfl:     metoprolol succinate (TOPROL-XL) 100 mg 24 hr tablet, Take 1 tablet (100 mg total) by mouth daily, Disp: 90 tablet, Rfl: 3    multivitamin (THERAGRAN) TABS, Take 1 tablet by mouth daily, Disp: , Rfl:     Omega-3 Fatty Acids (FISH OIL) 1200 MG CAPS, Take by mouth, Disp: , Rfl:     prednisoLONE acetate (PRED FORTE) 1 % ophthalmic suspension, INSTILL 1 DROP INTO LEFT EYE 4 TIMES A DAY, Disp: , Rfl:     tamsulosin (FLOMAX) 0 4 mg, Take 0 4 mg by mouth daily with dinner, Disp: , Rfl:     Biotin 1 MG CAPS, Take by mouth (Patient not taking: Reported on 6/23/2021), Disp: , Rfl:     levofloxacin (LEVAQUIN) 750 mg tablet, , Disp: , Rfl:     methenamine hippurate (HIPREX) 1 g tablet, Take by mouth TAKE 1/2 TAB EVERY 12 HOURS, Disp: , Rfl:     nitrofurantoin (MACROBID) 100 mg capsule, , Disp: , Rfl:     nitrofurantoin (MACRODANTIN) 50 mg capsule, , Disp: , Rfl:     polymyxin b-trimethoprim (POLYTRIM) ophthalmic solution, INSTILL 1 DROP INTO LEFT EYE 4 TIMES A DAY FOR 7 DAYS (Patient not taking: Reported on 6/23/2021), Disp: , Rfl:       Physical Exam  Constitutional:       Appearance: He is obese  Cardiovascular:      Rate and Rhythm: Normal rate and regular rhythm  Pulses: Normal pulses  Heart sounds: Normal heart sounds  No murmur heard  No gallop  Pulmonary:      Effort: Pulmonary effort is normal       Breath sounds: Normal breath sounds  Musculoskeletal:      Right lower leg: No edema  Left lower leg: No edema     Skin:     General: Skin is dry    Neurological:      Mental Status: He is alert and oriented to person, place, and time  Psychiatric:         Behavior: Behavior normal            Laboratory Studies:  CMP:      Invalid input(s): ALBUMIN  NT-proBNP:   Lab Results   Component Value Date    NTBNP 45 2018      Coags:    Lipid Profile:   Lab Results   Component Value Date    CHOL 175 2014     Lab Results   Component Value Date    HDL 44 2021     Lab Results   Component Value Date    LDLCALC 115 (H) 2021     Lab Results   Component Value Date    TRIG 145 2021       Cardiac testing:     EKG reviewed personally:  Normal sinus rhythm with left bundle branch block pattern    Results for orders placed during the hospital encounter of 10/19/20    Echo complete with contrast if indicated    Narrative  [address]  [address] [place] [address]  [phone]    Transthoracic Echocardiogram  2D, M-mode, Doppler, and Color Doppler    Study date:  19-Oct-2020    Patient: Ingrid Cowart  MR number: [id number]  Account number: [de-identified]  : 1950  Age: 71 years  Gender: Male  Status: Outpatient  Location: [address] [place] [address] [facility]  Height: 70 in  Weight: 239 6 lb  BP: 138/ 76 mmHg    Indications: Aortic valve replacement    Diagnoses: I10  - Essential (primary) hypertension, I44 7 - Left bundle-branch block, unspecified    Sonographer:  Scott Jacob BS, RDCS, RVT, RDMS  Primary Physician:  Christina Jo MD  Referring Physician:  Gonzales Oneill MD  Group:  [facility] [id number]  Cardiology Fellow: Dorita López MD  Interpreting Physician:  Gonzales Oneill MD    SUMMARY    LEFT VENTRICLE:  Systolic function was normal  Ejection fraction was estimated to be 55 %  There were no regional wall motion abnormalities  There was mild concentric hypertrophy    Features were consistent with a pseudonormal left ventricular filling pattern, with concomitant abnormal relaxation and increased filling pressure (grade 2 diastolic dysfunction)  VENTRICULAR SEPTUM:  There was "bounce" motion  These changes are consistent with a post-thoracotomy state  RIGHT VENTRICLE:  The size was normal   Systolic function was normal     MITRAL VALVE:  There was no significant perivalvular regurgitation  AORTIC VALVE:  A bioprosthesis was present  It exhibited trace central regurgitation, no paravalvular leak and otherwise normal function  Valve mean gradient was 10 8 mmHg  Estimated aortic valve area (by VTI) was 1 67 cmï¾²  TRICUSPID VALVE:  There was mild regurgitation  Estimated peak PA pressure was 35 mmHg  The findings suggest mild pulmonary hypertension  AORTA:  The root exhibited mild dilatation at 3 8 cm  COMPARISONS:  There has been no significant interval change  Comparison was made with the previous study of 12-Nov-2018  HISTORY: PRIOR HISTORY: LBBB, bioprosthetic AVR (for AS) with aortic root replacement 2013, HTN  PROCEDURE: The study was performed in the [address] [facility]  This was a routine study  The transthoracic approach was used  The study included complete 2D imaging, M-mode, complete spectral Doppler, and color Doppler  Images were obtained from the parasternal, apical, subcostal, and suprasternal notch acoustic windows  Image quality was good  LEFT VENTRICLE: Size was normal  Systolic function was normal  Ejection fraction was estimated to be 55 %  There were no regional wall motion abnormalities  Wall thickness was mildly increased  There was mild concentric hypertrophy  DOPPLER: Features were consistent with a pseudonormal left ventricular filling pattern, with concomitant abnormal relaxation and increased filling pressure (grade 2 diastolic dysfunction)  VENTRICULAR SEPTUM: There was "bounce" motion  These changes are consistent with a post-thoracotomy state      RIGHT VENTRICLE: The size was normal  Systolic function was normal  The moderator band was in a normal position and normal-sized  LEFT ATRIUM: The atrium was mildly dilated  RIGHT ATRIUM: The atrium was mildly dilated  MITRAL VALVE: Valve structure was normal  There was normal leaflet separation  DOPPLER: The transmitral velocity was within the normal range  There was no evidence for stenosis  There was trace regurgitation  Regurgitation grade was less  than 1+ on a scale of 0 to 4+  There was no significant perivalvular regurgitation  The regurgitant jet was centrally directed  AORTIC VALVE: A bioprosthesis was present  It exhibited trace central regurgitation, no paravalvular leak and otherwise normal function  TRICUSPID VALVE: The valve structure was normal  There was normal leaflet separation  DOPPLER: The transtricuspid velocity was within the normal range  There was no evidence for stenosis  There was mild regurgitation  Estimated peak PA  pressure was 35 mmHg  The findings suggest mild pulmonary hypertension  PULMONIC VALVE: Leaflets exhibited normal thickness, no calcification, and normal cuspal separation  DOPPLER: The transpulmonic velocity was within the normal range  There was no evidence for stenosis  There was mild regurgitation  PERICARDIUM: There was no pericardial effusion  The pericardium was normal in appearance  AORTA: The root exhibited mild dilatation at 3 8 cm  SYSTEMIC VEINS: IVC: The inferior vena cava was normal in size and course   Respirophasic changes were normal     MEASUREMENT TABLES    2D MEASUREMENTS  LVOT   (Reference normals)  Diam   24 mm   (--)    DOPPLER MEASUREMENTS  LVOT   (Reference normals)  Peak joey   77 cm/s   (--)  Mean joey   52 cm/s   (--)  VTI   18 7 cm   (--)  Peak gradient   2 mmHg   (--)  Mean gradient   1 2 mmHg   (--)  Stroke vol   84 6 ml   (--)  Aortic valve   (Reference normals)  Peak joey   217 cm/s   (--)  Mean joey   154 cm/s   (--)  VTI   50 7 cm   (--)  Peak gradient   18 8 mmHg (--)  Mean gradient   10 8 mmHg   (--)  Obstr index, VTI   0 37    (--)  Valve area, VTI   1 67 cmï¾²   (--)  Obstr index, Vmax   0 35    (--)  Valve area, Vmax   1 58 cmï¾²   (--)  Obstr index, Vmean   0 34    (--)  Valve area, Vmean   1 54 cmï¾²   (--)    SYSTEM MEASUREMENT TABLES    2D  %FS: 25 64 %  EDV(Teich): 129 97 ml  EF(Teich): 50 13 %  ESV(Teich): 64 82 ml  IVSd: 1 25 cm  LA Area: 20 68 cm2  LVEDV MOD A4C: 149 82 ml  LVEF MOD A4C: 52 65 %  LVESV MOD A4C: 70 94 ml  LVIDd: 5 21 cm  LVIDs: 3 87 cm  LVLd A4C: 9 05 cm  LVLs A4C: 7 39 cm  LVOT Diam: 2 4 cm  LVPWd: 1 07 cm  RA Area: 18 93 cm2  RVIDd: 3 96 cm  SV MOD A4C: 78 88 ml  SV(Teich): 65 15 ml    CW  AV Env  Ti: 329 84 ms  AV VTI: 50 72 cm  AV Vmax: 2 21 m/s  AV Vmean: 1 54 m/s  AV maxP 76 mmHg  AV meanPG: 10 76 mmHg  MV PHT: 111 52 ms  MV VTI: 46 17 cm  MV Vmax: 1 26 m/s  MV Vmean: 0 64 m/s  MV maxP 34 mmHg  MV meanP mmHg  MVA By PHT: 1 97 cm2  TR Vmax: 2 76 m/s  TR maxP 39 mmHg    MM  Ao Diam: 3 9 cm  LA Diam: 5 48 cm  LA/Ao: 1 4  TAPSE: 2 12 cm    PW  ZIGGY (VTI): 1 67 cm2  ZIGGY Vmax: 1 57 cm2  AVAI (VTI): 0 cm2/m2  AVAI Vmax: 0 cm2/m2  E' Lat: 0 1 m/s  E/E' Lat: 10 67  LVOT Env  Ti: 362 51 ms  LVOT VTI: 18 68 cm  LVOT Vmax: 0 77 m/s  LVOT Vmean: 0 52 m/s  LVOT maxP 35 mmHg  LVOT meanP 24 mmHg  LVSI Dopp: 37 43 ml/m2  LVSV Dopp: 84 59 ml  MV A Quinton: 1 06 m/s  MV Dec Childress: 3 15 m/s2  MV DecT: 338 34 ms  MV E Quinton: 1 07 m/s  MV E/A Ratio: 1  MVA (VTI): 1 83 cm2    [employer] Accredited Echocardiography Laboratory    Prepared and electronically signed by    Jem Hoffmann MD  Signed 19-Oct-2020 13:36:01    No results found for this or any previous visit  No results found for this or any previous visit  No results found for this or any previous visit        Jem Hoffmann MD    Portions of the record may have been created with voice recognition software   Occasional wrong word or "sound a like" substitutions may have occurred due to the inherent limitations of voice recognition software   Read the chart carefully and recognize, using context, where substitutions have occurred

## 2021-10-16 DIAGNOSIS — I10 HYPERTENSION, UNSPECIFIED TYPE: ICD-10-CM

## 2021-10-18 RX ORDER — METOPROLOL SUCCINATE 100 MG/1
TABLET, EXTENDED RELEASE ORAL
Qty: 90 TABLET | Refills: 3 | Status: SHIPPED | OUTPATIENT
Start: 2021-10-18 | End: 2021-12-17 | Stop reason: SDUPTHER

## 2021-10-19 ENCOUNTER — APPOINTMENT (OUTPATIENT)
Dept: LAB | Facility: MEDICAL CENTER | Age: 71
End: 2021-10-19
Payer: MEDICARE

## 2021-10-19 DIAGNOSIS — E53.8 CYANOCOBALAMIN DEFICIENCY: ICD-10-CM

## 2021-10-19 DIAGNOSIS — R73.03 PREDIABETES: ICD-10-CM

## 2021-10-19 DIAGNOSIS — I44.7 LBBB (LEFT BUNDLE BRANCH BLOCK): ICD-10-CM

## 2021-10-19 DIAGNOSIS — E78.1 HYPERTRIGLYCERIDEMIA: ICD-10-CM

## 2021-10-19 DIAGNOSIS — I10 HYPERTENSION, UNSPECIFIED TYPE: ICD-10-CM

## 2021-10-19 LAB
ALBUMIN SERPL BCP-MCNC: 3.5 G/DL (ref 3.5–5)
ALP SERPL-CCNC: 78 U/L (ref 46–116)
ALT SERPL W P-5'-P-CCNC: 32 U/L (ref 12–78)
ANION GAP SERPL CALCULATED.3IONS-SCNC: 3 MMOL/L (ref 4–13)
AST SERPL W P-5'-P-CCNC: 15 U/L (ref 5–45)
BASOPHILS # BLD AUTO: 0.07 THOUSANDS/ΜL (ref 0–0.1)
BASOPHILS NFR BLD AUTO: 1 % (ref 0–1)
BILIRUB SERPL-MCNC: 0.52 MG/DL (ref 0.2–1)
BUN SERPL-MCNC: 30 MG/DL (ref 5–25)
CALCIUM SERPL-MCNC: 9.5 MG/DL (ref 8.3–10.1)
CHLORIDE SERPL-SCNC: 108 MMOL/L (ref 100–108)
CK SERPL-CCNC: 89 U/L (ref 39–308)
CO2 SERPL-SCNC: 27 MMOL/L (ref 21–32)
CREAT SERPL-MCNC: 1.06 MG/DL (ref 0.6–1.3)
EOSINOPHIL # BLD AUTO: 0.3 THOUSAND/ΜL (ref 0–0.61)
EOSINOPHIL NFR BLD AUTO: 3 % (ref 0–6)
ERYTHROCYTE [DISTWIDTH] IN BLOOD BY AUTOMATED COUNT: 13.4 % (ref 11.6–15.1)
EST. AVERAGE GLUCOSE BLD GHB EST-MCNC: 111 MG/DL
GFR SERPL CREATININE-BSD FRML MDRD: 71 ML/MIN/1.73SQ M
GLUCOSE P FAST SERPL-MCNC: 102 MG/DL (ref 65–99)
HBA1C MFR BLD: 5.5 %
HCT VFR BLD AUTO: 46 % (ref 36.5–49.3)
HGB BLD-MCNC: 15 G/DL (ref 12–17)
IMM GRANULOCYTES # BLD AUTO: 0.04 THOUSAND/UL (ref 0–0.2)
IMM GRANULOCYTES NFR BLD AUTO: 0 % (ref 0–2)
LYMPHOCYTES # BLD AUTO: 2.8 THOUSANDS/ΜL (ref 0.6–4.47)
LYMPHOCYTES NFR BLD AUTO: 29 % (ref 14–44)
MCH RBC QN AUTO: 30.4 PG (ref 26.8–34.3)
MCHC RBC AUTO-ENTMCNC: 32.6 G/DL (ref 31.4–37.4)
MCV RBC AUTO: 93 FL (ref 82–98)
MONOCYTES # BLD AUTO: 0.92 THOUSAND/ΜL (ref 0.17–1.22)
MONOCYTES NFR BLD AUTO: 10 % (ref 4–12)
NEUTROPHILS # BLD AUTO: 5.47 THOUSANDS/ΜL (ref 1.85–7.62)
NEUTS SEG NFR BLD AUTO: 57 % (ref 43–75)
NRBC BLD AUTO-RTO: 0 /100 WBCS
PLATELET # BLD AUTO: 269 THOUSANDS/UL (ref 149–390)
PMV BLD AUTO: 10.2 FL (ref 8.9–12.7)
POTASSIUM SERPL-SCNC: 4 MMOL/L (ref 3.5–5.3)
PROT SERPL-MCNC: 7 G/DL (ref 6.4–8.2)
RBC # BLD AUTO: 4.94 MILLION/UL (ref 3.88–5.62)
SODIUM SERPL-SCNC: 138 MMOL/L (ref 136–145)
VIT B12 SERPL-MCNC: 489 PG/ML (ref 100–900)
WBC # BLD AUTO: 9.6 THOUSAND/UL (ref 4.31–10.16)

## 2021-10-19 PROCEDURE — 82784 ASSAY IGA/IGD/IGG/IGM EACH: CPT

## 2021-10-19 PROCEDURE — 85025 COMPLETE CBC W/AUTO DIFF WBC: CPT

## 2021-10-19 PROCEDURE — 86255 FLUORESCENT ANTIBODY SCREEN: CPT

## 2021-10-19 PROCEDURE — 80053 COMPREHEN METABOLIC PANEL: CPT

## 2021-10-19 PROCEDURE — 82550 ASSAY OF CK (CPK): CPT

## 2021-10-19 PROCEDURE — 82607 VITAMIN B-12: CPT

## 2021-10-19 PROCEDURE — 36415 COLL VENOUS BLD VENIPUNCTURE: CPT

## 2021-10-19 PROCEDURE — 83516 IMMUNOASSAY NONANTIBODY: CPT

## 2021-10-19 PROCEDURE — 83036 HEMOGLOBIN GLYCOSYLATED A1C: CPT

## 2021-10-20 LAB
ENDOMYSIUM IGA SER QL: NEGATIVE
GLIADIN PEPTIDE IGA SER-ACNC: 5 UNITS (ref 0–19)
GLIADIN PEPTIDE IGG SER-ACNC: <1 UNITS (ref 0–19)
IGA SERPL-MCNC: 169 MG/DL (ref 61–437)
TTG IGA SER-ACNC: 2 U/ML (ref 0–3)
TTG IGG SER-ACNC: 10 U/ML (ref 0–5)

## 2021-11-19 ENCOUNTER — HOSPITAL ENCOUNTER (OUTPATIENT)
Dept: NON INVASIVE DIAGNOSTICS | Facility: CLINIC | Age: 71
Discharge: HOME | End: 2021-11-19
Payer: MEDICARE

## 2021-11-19 VITALS
SYSTOLIC BLOOD PRESSURE: 140 MMHG | BODY MASS INDEX: 34.8 KG/M2 | WEIGHT: 235 LBS | DIASTOLIC BLOOD PRESSURE: 78 MMHG | HEART RATE: 71 BPM | HEIGHT: 69 IN

## 2021-11-19 DIAGNOSIS — Z95.4 STATUS POST COMBINED AORTIC ROOT AND VALVE REPLACEMENT USING STENTLESS BIOPROSTHETIC AORTIC VALVE: ICD-10-CM

## 2021-11-19 DIAGNOSIS — I44.7 LBBB (LEFT BUNDLE BRANCH BLOCK): ICD-10-CM

## 2021-11-19 DIAGNOSIS — Z95.2 STATUS POST COMBINED AORTIC ROOT AND VALVE REPLACEMENT USING STENTLESS BIOPROSTHETIC AORTIC VALVE: ICD-10-CM

## 2021-11-19 DIAGNOSIS — I10 HYPERTENSION, UNSPECIFIED TYPE: ICD-10-CM

## 2021-11-19 LAB
AORTIC ROOT: 3.9 CM
AORTIC VALVE MEAN VELOCITY: 13.8 M/S
APICAL FOUR CHAMBER EJECTION FRACTION: 56 %
AV AREA BY CONTINUOUS VTI: 1.6 CM2
AV AREA PEAK VELOCITY: 1.8 CM2
AV LVOT MEAN GRADIENT: 3 MMHG
AV LVOT PEAK GRADIENT: 5 MMHG
AV MEAN GRADIENT: 9 MMHG
AV PEAK GRADIENT: 16 MMHG
AV VALVE AREA: 1.58 CM2
DOP CALC AO VTI: 48.54 CM
DOP CALC LVOT AREA: 3.14 CM2
DOP CALC LVOT DIAMETER: 2 CM
DOP CALC LVOT PEAK VEL VTI: 24.48 CM
DOP CALC LVOT PEAK VEL: 1.11 M/S
DOP CALC LVOT STROKE INDEX: 34.4 ML/M2
DOP CALC LVOT STROKE VOLUME: 76.87 CM3
DOP CALC MV VTI: 41.15 CM
E WAVE DECELERATION TIME: 376 MS
FRACTIONAL SHORTENING: 28 % (ref 28–44)
INTERVENTRICULAR SEPTUM IN DIASTOLE (PARASTERNAL SHORT AXIS VIEW): 1.6 CM
LEFT ATRIUM AREA SYSTOLE SINGLE PLANE A4C: 25.9 CM2
LEFT INTERNAL DIMENSION IN SYSTOLE: 3.4 CM (ref 2.1–4)
LEFT VENTRICULAR INTERNAL DIMENSION IN DIASTOLE: 4.7 CM (ref 7.58–11.3)
LEFT VENTRICULAR POSTERIOR WALL IN END DIASTOLE: 1.4 CM
LEFT VENTRICULAR STROKE VOLUME: 51 ML
MV E'TISSUE VEL-LAT: 8 CM/S
MV E'TISSUE VEL-SEP: 6 CM/S
MV MEAN GRADIENT: 3 MMHG
MV PEAK A VEL: 1.12 M/S
MV PEAK E VEL: 107 CM/S
MV PEAK GRADIENT: 6 MMHG
MV STENOSIS PRESSURE HALF TIME: 0 MS
MV VALVE AREA BY CONTINUITY EQUATION: 1.87 CM2
RA PRESSURE ESTIMATED: 3 MMHG
RIGHT ATRIUM AREA SYSTOLE A4C: 14.1 CM2
RIGHT VENTRICLE ID DIMENSION: 3.6 CM
RV PSP: 25 MMHG
SL CV LV EF: 55
SL CV PED ECHO LEFT VENTRICLE DIASTOLIC VOLUME (MOD BIPLANE) 2D: 100 ML
SL CV PED ECHO LEFT VENTRICLE SYSTOLIC VOLUME (MOD BIPLANE) 2D: 49 ML
TR MAX PG: 22 MMHG
TR PEAK VELOCITY: 2.3 M/S
TRICUSPID VALVE PEAK REGURGITATION VELOCITY: 2.55 M/S
TRICUSPID VALVE S': 1.1 CM/S
TV PEAK GRADIENT: 26 MMHG
Z-SCORE OF LEFT VENTRICULAR DIMENSION IN END SYSTOLE: -6.78

## 2021-11-19 PROCEDURE — 93306 TTE W/DOPPLER COMPLETE: CPT | Performed by: INTERNAL MEDICINE

## 2021-11-19 PROCEDURE — 93306 TTE W/DOPPLER COMPLETE: CPT

## 2021-12-15 ENCOUNTER — RA CDI HCC (OUTPATIENT)
Dept: OTHER | Facility: HOSPITAL | Age: 71
End: 2021-12-15

## 2021-12-17 DIAGNOSIS — I10 HYPERTENSION, UNSPECIFIED TYPE: ICD-10-CM

## 2021-12-20 RX ORDER — METOPROLOL SUCCINATE 100 MG/1
100 TABLET, EXTENDED RELEASE ORAL DAILY
Qty: 90 TABLET | Refills: 3 | Status: SHIPPED | OUTPATIENT
Start: 2021-12-20

## 2022-01-10 ENCOUNTER — APPOINTMENT (OUTPATIENT)
Dept: LAB | Facility: MEDICAL CENTER | Age: 72
End: 2022-01-10
Payer: MEDICARE

## 2022-01-10 DIAGNOSIS — N39.0 URINARY TRACT INFECTION WITHOUT HEMATURIA, SITE UNSPECIFIED: ICD-10-CM

## 2022-01-10 DIAGNOSIS — N13.8 ENLARGED PROSTATE WITH URINARY OBSTRUCTION: ICD-10-CM

## 2022-01-10 DIAGNOSIS — N40.1 ENLARGED PROSTATE WITH URINARY OBSTRUCTION: ICD-10-CM

## 2022-01-10 LAB
BUN SERPL-MCNC: 22 MG/DL (ref 5–25)
CREAT SERPL-MCNC: 1.14 MG/DL (ref 0.6–1.3)
ERYTHROCYTE [DISTWIDTH] IN BLOOD BY AUTOMATED COUNT: 13.4 % (ref 11.6–15.1)
GFR SERPL CREATININE-BSD FRML MDRD: 64 ML/MIN/1.73SQ M
HCT VFR BLD AUTO: 47.5 % (ref 36.5–49.3)
HGB BLD-MCNC: 15.8 G/DL (ref 12–17)
MCH RBC QN AUTO: 29.9 PG (ref 26.8–34.3)
MCHC RBC AUTO-ENTMCNC: 33.3 G/DL (ref 31.4–37.4)
MCV RBC AUTO: 90 FL (ref 82–98)
PLATELET # BLD AUTO: 262 THOUSANDS/UL (ref 149–390)
PMV BLD AUTO: 10.4 FL (ref 8.9–12.7)
PSA SERPL-MCNC: 1.6 NG/ML (ref 0–4)
RBC # BLD AUTO: 5.28 MILLION/UL (ref 3.88–5.62)
WBC # BLD AUTO: 9.15 THOUSAND/UL (ref 4.31–10.16)

## 2022-01-10 PROCEDURE — 84153 ASSAY OF PSA TOTAL: CPT

## 2022-01-10 PROCEDURE — 85027 COMPLETE CBC AUTOMATED: CPT

## 2022-01-10 PROCEDURE — 82565 ASSAY OF CREATININE: CPT

## 2022-01-10 PROCEDURE — 36415 COLL VENOUS BLD VENIPUNCTURE: CPT

## 2022-01-10 PROCEDURE — 84520 ASSAY OF UREA NITROGEN: CPT

## 2022-07-15 ENCOUNTER — RA CDI HCC (OUTPATIENT)
Dept: OTHER | Facility: HOSPITAL | Age: 72
End: 2022-07-15

## 2022-07-15 NOTE — PROGRESS NOTES
[facility] [address]  coding opportunities       Chart reviewed, no opportunity found: CHART REVIEWED, NO OPPORTUNITY FOUND        Patients Insurance     Medicare Insurance: Medicare

## 2022-07-22 ENCOUNTER — OFFICE VISIT (OUTPATIENT)
Dept: FAMILY MEDICINE CLINIC | Facility: CLINIC | Age: 72
End: 2022-07-22
Payer: MEDICARE

## 2022-07-22 VITALS
SYSTOLIC BLOOD PRESSURE: 130 MMHG | TEMPERATURE: 98.8 F | HEIGHT: 69 IN | OXYGEN SATURATION: 97 % | WEIGHT: 233 LBS | BODY MASS INDEX: 34.51 KG/M2 | HEART RATE: 77 BPM | DIASTOLIC BLOOD PRESSURE: 74 MMHG

## 2022-07-22 DIAGNOSIS — G35 MULTIPLE SCLEROSIS (HCC): ICD-10-CM

## 2022-07-22 DIAGNOSIS — I48.91 ATRIAL FIBRILLATION, UNSPECIFIED TYPE (HCC): Primary | ICD-10-CM

## 2022-07-22 DIAGNOSIS — H26.9 CATARACT OF LEFT EYE, UNSPECIFIED CATARACT TYPE: ICD-10-CM

## 2022-07-22 DIAGNOSIS — Z01.818 PREOP EXAMINATION: ICD-10-CM

## 2022-07-22 PROCEDURE — 99214 OFFICE O/P EST MOD 30 MIN: CPT | Performed by: FAMILY MEDICINE

## 2022-07-22 RX ORDER — BROMFENAC SODIUM 0.7 MG/ML
SOLUTION/ DROPS OPHTHALMIC
COMMUNITY
Start: 2022-07-12 | End: 2022-09-20

## 2022-07-22 RX ORDER — OFLOXACIN 3 MG/ML
SOLUTION/ DROPS OPHTHALMIC
COMMUNITY
Start: 2022-07-05 | End: 2022-09-20

## 2022-07-22 NOTE — PROGRESS NOTES
Subjective:     Allison Romano is a 70 y o  male who presents to the office today for a preoperative consultation at the request of surgeon Dr Fred Ramos who plans on performing catract surgery on August 10  This consultation is requested for the specific conditions prompting preoperative evaluation (i e  because of potential affect on operative risk): infection  Planned anesthesia: MAC  The patient has the following known anesthesia issues: none  Patients bleeding risk: no recent abnormal bleeding  Patient does not have objections to receiving blood products if needed  The following portions of the patient's history were reviewed and updated as appropriate: allergies, current medications, past family history, past social history, past surgical history and problem list     Review of Systems  Pertinent items are noted in HPI       Objective:     /74 (BP Location: Left arm, Patient Position: Sitting, Cuff Size: Large)   Pulse 77   Temp 98 8 °F (37 1 °C) (Tympanic)   Ht 5' 9" (1 753 m)   Wt 106 kg (233 lb)   SpO2 97%   BMI 34 41 kg/m²     General Appearance:    Alert, cooperative, no distress, appears stated age   Head:    Normocephalic, without obvious abnormality, atraumatic   Eyes:    PERRL, conjunctiva/corneas clear, EOM's intact, fundi     benign, both eyes        Ears:    Normal TM's and external ear canals, both ears   Nose:   Nares normal, septum midline, mucosa normal, no drainage    or sinus tenderness   Throat:   Lips, mucosa, and tongue normal; teeth and gums normal   Neck:   Supple, symmetrical, trachea midline, no adenopathy;        thyroid:  No enlargement/tenderness/nodules; no carotid    bruit or JVD   Back:     Symmetric, no curvature, ROM normal, no CVA tenderness   Lungs:     Clear to auscultation bilaterally, respirations unlabored   Chest wall:    No tenderness or deformity   Heart:    Regular rate and rhythm, S1 and S2 normal, no murmur, rub   or gallop   Abdomen:     Soft, non-tender, bowel sounds active all four quadrants,     no masses, no organomegaly   Genitalia:    Normal male without lesion, discharge or tenderness   Rectal:    Normal tone, normal prostate, no masses or tenderness;    guaiac negative stool   Extremities:   Extremities normal, atraumatic, no cyanosis or edema   Pulses:   2+ and symmetric all extremities   Skin:   Skin color, texture, turgor normal, no rashes or lesions   Lymph nodes:   Cervical, supraclavicular, and axillary nodes normal   Neurologic:   CNII-XII intact  Normal strength, sensation and reflexes       throughout       Predictors of intubation difficulty:   Morbid obesity? no   Anatomically abnormal facies? no   Prominent incisors? no   Receding mandible? no   Short, thick neck? no   Neck range of motion: normal   Mallampati score: I (soft palate, uvula, fauces, and tonsillar pillars visible)   Thyromental distance: < 6cm   Mouth openincm   Dentition: No chipped, loose, or missing teeth  Cardiographics  ECG: not done=not needed  Echocardiogram: normal and reviewed by myself    Imaging  Chest x-ray: not done not needed     Lab Review   No visits with results within 2 Month(s) from this visit  Latest known visit with results is:   Appointment on 01/10/2022   Component Date Value    WBC 01/10/2022 9 15     RBC 01/10/2022 5 28     Hemoglobin 01/10/2022 15 8     Hematocrit 01/10/2022 47 5     MCV 01/10/2022 90     MCH 01/10/2022 29 9     MCHC 01/10/2022 33 3     RDW 01/10/2022 13 4     Platelets  262     MPV 01/10/2022 10 4     BUN 01/10/2022 22     Creatinine 01/10/2022 1 14     eGFR 01/10/2022 64     PSA, Diagnostic 01/10/2022 1 6         Assessment:     70 y o  male with planned surgery as above  Known risk factors for perioperative complications: None  multiple sclerosis    Difficulty with intubation is not anticipated      Cardiac Risk Estimation: low    Current medications which may produce withdrawal symptoms if withheld perioperatively: none      Plan:  Pt is optimized for planned surgery   1  Preoperative workup as follows none  2  Change in medication regimen before surgery: stop ASA 5 days prior to surgery, stop flomax 1 week prior to surgery to prevent floppy iris syndrome  3  Prophylaxis for cardiac events with perioperative beta-blockers: not indicated  4  Invasive hemodynamic monitoring perioperatively: not indicated    5  Deep vein thrombosis prophylaxis postoperatively:regimen to be chosen by surgical team   6  Surveillance for postoperative MI with ECG immediately postoperatively and on postoperative days 1 and 2 AND troponin levels 24 hours postoperatively and on day 4 or hospital discharge (whichever comes first): at the discretion of anesthesiologist   7  Other measures: none

## 2022-07-25 ENCOUNTER — TELEPHONE (OUTPATIENT)
Dept: FAMILY MEDICINE CLINIC | Facility: CLINIC | Age: 72
End: 2022-07-25

## 2022-07-25 NOTE — TELEPHONE ENCOUNTER
Please let pt know I faxed over the preop to dr Debbie Mueller with all the information he provided, he should be good for the surgery and I thank him for the information!

## 2022-07-25 NOTE — TELEPHONE ENCOUNTER
Patient called the surgeon is dr Marie Torrez   Surgery date 8/10/22     [place] for specialized surgery   fernanda duron

## 2022-08-23 ENCOUNTER — HOSPITAL ENCOUNTER (OUTPATIENT)
Dept: NON INVASIVE DIAGNOSTICS | Facility: MEDICAL CENTER | Age: 72
Discharge: HOME | End: 2022-08-23
Payer: MEDICARE

## 2022-08-23 VITALS
HEIGHT: 69 IN | DIASTOLIC BLOOD PRESSURE: 74 MMHG | WEIGHT: 233 LBS | SYSTOLIC BLOOD PRESSURE: 130 MMHG | BODY MASS INDEX: 34.51 KG/M2 | HEART RATE: 77 BPM

## 2022-08-23 DIAGNOSIS — E78.1 HYPERTRIGLYCERIDEMIA: ICD-10-CM

## 2022-08-23 DIAGNOSIS — Z95.4 STATUS POST COMBINED AORTIC ROOT AND VALVE REPLACEMENT USING STENTLESS BIOPROSTHETIC AORTIC VALVE: ICD-10-CM

## 2022-08-23 DIAGNOSIS — I35.9 AORTIC VALVE DISORDER: ICD-10-CM

## 2022-08-23 DIAGNOSIS — Z01.810 PREOP CARDIOVASCULAR EXAM: ICD-10-CM

## 2022-08-23 DIAGNOSIS — I44.7 LEFT BUNDLE BRANCH BLOCK (LBBB): ICD-10-CM

## 2022-08-23 DIAGNOSIS — I10 HYPERTENSION, UNSPECIFIED TYPE: ICD-10-CM

## 2022-08-23 DIAGNOSIS — Z95.2 STATUS POST COMBINED AORTIC ROOT AND VALVE REPLACEMENT USING STENTLESS BIOPROSTHETIC AORTIC VALVE: ICD-10-CM

## 2022-08-23 LAB
AORTIC ROOT: 3.9 CM
AORTIC VALVE MEAN VELOCITY: 12.5 M/S
APICAL FOUR CHAMBER EJECTION FRACTION: 51 %
ASCENDING AORTA: 3 CM
AV AREA BY CONTINUOUS VTI: 2.8 CM2
AV AREA PEAK VELOCITY: 2.5 CM2
AV LVOT MEAN GRADIENT: 2 MMHG
AV LVOT PEAK GRADIENT: 4 MMHG
AV MEAN GRADIENT: 8 MMHG
AV PEAK GRADIENT: 16 MMHG
AV VALVE AREA: 2.76 CM2
AV VELOCITY RATIO: 0.51
DOP CALC AO PEAK VEL: 1.98 M/S
DOP CALC AO VTI: 43.64 CM
DOP CALC LVOT AREA: 4.91 CM2
DOP CALC LVOT DIAMETER: 2.5 CM
DOP CALC LVOT PEAK VEL VTI: 24.56 CM
DOP CALC LVOT PEAK VEL: 1 M/S
DOP CALC LVOT STROKE INDEX: 53.6 ML/M2
DOP CALC LVOT STROKE VOLUME: 120.5 CM3
E WAVE DECELERATION TIME: 294 MS
FRACTIONAL SHORTENING: 29 % (ref 28–44)
INTERVENTRICULAR SEPTUM IN DIASTOLE (PARASTERNAL SHORT AXIS VIEW): 1.2 CM
INTERVENTRICULAR SEPTUM: 1.2 CM (ref 0.6–1.1)
LAAS-AP2: 27.7 CM2
LAAS-AP4: 26.4 CM2
LEFT ATRIUM SIZE: 5 CM
LEFT INTERNAL DIMENSION IN SYSTOLE: 3.6 CM (ref 2.1–4)
LEFT VENTRICULAR INTERNAL DIMENSION IN DIASTOLE: 5.1 CM (ref 3.5–6)
LEFT VENTRICULAR POSTERIOR WALL IN END DIASTOLE: 1.2 CM
LEFT VENTRICULAR STROKE VOLUME: 72 ML
LVSV (TEICH): 72 ML
MV E'TISSUE VEL-SEP: 6 CM/S
MV PEAK A VEL: 1.12 M/S
MV PEAK E VEL: 113 CM/S
MV STENOSIS PRESSURE HALF TIME: 85 MS
MV VALVE AREA P 1/2 METHOD: 2.59 CM2
PA SYSTOLIC PRESSURE: 24 MMHG
RIGHT ATRIAL 2D VOLUME: 40 ML
RIGHT ATRIUM AREA SYSTOLE A4C: 17.3 CM2
RIGHT VENTRICLE ID DIMENSION: 3.3 CM
SL CV LEFT ATRIUM LENGTH A2C: 6.3 CM
SL CV LV EF: 60
SL CV PED ECHO LEFT VENTRICLE DIASTOLIC VOLUME (MOD BIPLANE) 2D: 125 ML
SL CV PED ECHO LEFT VENTRICLE SYSTOLIC VOLUME (MOD BIPLANE) 2D: 53 ML
TR MAX PG: 32 MMHG
TR PEAK VELOCITY: 2.8 M/S
TRICUSPID VALVE PEAK REGURGITATION VELOCITY: 2.83 M/S

## 2022-08-23 PROCEDURE — 93306 TTE W/DOPPLER COMPLETE: CPT

## 2022-08-23 PROCEDURE — 93306 TTE W/DOPPLER COMPLETE: CPT | Performed by: INTERNAL MEDICINE

## 2022-09-20 ENCOUNTER — OFFICE VISIT (OUTPATIENT)
Dept: CARDIOLOGY CLINIC | Facility: CLINIC | Age: 72
End: 2022-09-20
Payer: MEDICARE

## 2022-09-20 VITALS
HEART RATE: 60 BPM | DIASTOLIC BLOOD PRESSURE: 78 MMHG | WEIGHT: 237.6 LBS | OXYGEN SATURATION: 95 % | HEIGHT: 69 IN | SYSTOLIC BLOOD PRESSURE: 132 MMHG | BODY MASS INDEX: 35.19 KG/M2

## 2022-09-20 DIAGNOSIS — Z95.4 STATUS POST COMBINED AORTIC ROOT AND VALVE REPLACEMENT USING STENTLESS BIOPROSTHETIC AORTIC VALVE: ICD-10-CM

## 2022-09-20 DIAGNOSIS — I44.7 LBBB (LEFT BUNDLE BRANCH BLOCK): ICD-10-CM

## 2022-09-20 DIAGNOSIS — I35.9 AORTIC VALVE DISORDER: ICD-10-CM

## 2022-09-20 DIAGNOSIS — E66.01 OBESITY, MORBID (HCC): ICD-10-CM

## 2022-09-20 DIAGNOSIS — Z95.2 STATUS POST COMBINED AORTIC ROOT AND VALVE REPLACEMENT USING STENTLESS BIOPROSTHETIC AORTIC VALVE: ICD-10-CM

## 2022-09-20 DIAGNOSIS — Z01.810 PREOP CARDIOVASCULAR EXAM: ICD-10-CM

## 2022-09-20 DIAGNOSIS — I10 HYPERTENSION, UNSPECIFIED TYPE: Primary | ICD-10-CM

## 2022-09-20 DIAGNOSIS — E78.1 HYPERTRIGLYCERIDEMIA: ICD-10-CM

## 2022-09-20 PROCEDURE — 99214 OFFICE O/P EST MOD 30 MIN: CPT | Performed by: INTERNAL MEDICINE

## 2022-09-20 NOTE — PROGRESS NOTES
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        LBBB (left bundle branch block)        Preop cardiovascular exam                Plan and discussion  Patient seen September 20, 2022  Aortic valve replaced year 2000   Aortic valve replaced 2013 with the Bovie tissue valve  He also had aortic root replacement at that time  He had post operative atrial fibrillation  No atrial fibrillation now   Stable left bundle  Normal coronaries time of the catheterization  Is also had basal cell carcinoma  His cervical disc surgery in the past     He has multiple sclerosis for many years  He is stable from a cardiac standpoint  Recent echocardiogram shows no gradient across this valve  His grade 1 diastolic dysfunction normal left ventricular function                     Plan discussion  He is here for clearance for 2nd surgery who will side  His been no change since I last saw him  From a cardiac standpoint he has been rock stable  He had aortic valve replacement year 2000 and and 2013 he had a Bovie an aortic tissue of valve put in as well as aortic root replacement  He has had no atrial fibrillation since his surgery  He has a stable left bundle  His coronaries are normal time of catheterization  She also had basal cell carcinoma in the past S2 well as cervical disc surgery  His major problem in his life is been multiple sclerosis he has had for many years  This is followed at the [facility]  I did clear him    He is clear from a cardiac standpoint              HPI: Roshni Villalba is a 79y o  year old male    HPI: Phong SERNA [place] is a 75 y o  year old male    HPI: Phong SERNA [place] is [id number] y o  year old male HPI: Phong SERNA [place] is a 74 y  o  year old male History of Present Illness  Aortic root replacement with a Bovie aortic tissue valve April 10, 2013  He went home on amiodarone for one month  No further episodes of atrial fibrillation or flutter  His initial surgery was mechanical aortic valve replacement the year 2000  At that time he was quoted as a unicuspid valve  He has multiple sclerosis and is under therapy at this time  Holter counter in may of 2013 showed bradycardia  He is off the amiodarone  At that time he was on amiodarone and beta blocker  He is presently just on Metroprolol 100 daily  During his heart catheterization his coronaries were normal   He's had C4-C5 anterior cervical discectomy and fusion at [facility]  He has a history of colon polyps  His a history of basal cell carcinoma 3 separate occasions  There is a family history of melanoma  He has no issues with his cardiac system at the present time  Echocardiogram done June of 2013  Mild left ventricular hypertrophy with ejection fraction of 60%  There is left atrial enlargement  The proximal aorta 3 9 cm     Patient seen 11 6 2014  As noted above he has a low been aortic tissue valve put in April 2013  His aortic root has been repaired at that time as well  He also has multiple sclerosis, he had postoperative atrial fibrillation, no atrial fibrillation since his surgery, he is off amiodarone, his coronaries are normal, he has cervical discectomy and fusion at [facility] in the past,  Recently had an episode of near syncope  His is followed by diaphoresis  He went to [facility]  Monitor for 4 days  No rigidity is noted  In addition he had an event recorder past months  No evidence of atrial fib  Last echocardiogram June 2014 shows left ventricle normal  Prosthetic valve is functioning well   There are no cardiac issues  His multiple sclerosis seems to be progressive he is going to [address]     Patient seen November 10, 2015  He had a Bovie and aortic valve and aortic root replacement April 2013  First mechanical valves in year 2000  He has had no further atrial fibrillation  The new issue for him if she has developed a first-degree AV block and a left bundle-branch block pattern  He's had no syncope or near-syncope  His multiple sclerosis unfortunately has progressive  He an echocardiogram in June  His aortic valve is functioning well  Plan an echocardiogram in one year and a Holter before that time  He'll report any syncope      Patient seen November 1, 2016  He has second bioprosthetic valve  Aortic root  2013  He's had no further atrial fibrillation  Echocardiogram shows a 12 mm gradient  His primary issue has multiple sclerosis under therapy  Cardiac standpoint he has been very stable     Patient seen November 21, 2017  Echocardiogram shows a very mild paravalvular leak  This is his 2nd valve in the aortic root was repaired  It was done in 2013      HPI    Plan patient seen November 19, 2019  In the year 2000 aortic valve replacement  In 2000 13 he had a aortic root replaced and a Bovie in aortic tissue valve put in   This was April 10, 2013  He had atrial fibrillation had 1 month treatment with amiodarone  He has a left bundle branch block pattern  He is on beta-blocker  Coronaries are normal in the past catheterization  He had a cervical diskectomy in [facility]  Is basal cell carcinomas removed  Holter counter November 2018 with his left bundle shows no high-grade block  LDL is 105  Creatinine is 1 3  Last echocardiogram November 2018 shows ejection fraction 55% with LVH  Bovie in valve is fine  Just plan on echocardiogram in a year    He is being treated for multiple sclerosis                       Plan in discussion  Patient is here for clearance for eye surgery at [facility]     Aortic valve replacement in year 2000   Two thousand thirteen aortic root replaced and a Bovie in aortic tissue valve put in   It was April 2013   Short episode of atrial fibrillation postop  Stable left bundle branch  On beta-blocker  Coronary is normal at type of catheterization   Basal cell carcinoma in the past   Cervical disc surgery [facility] the past   No cardiac symptoms     Last echocardiogram 2020 with stable bioprosthetic valve and normal left her function     No heart failure or angina     He is cleared for his ocular surgery      HPI: Lamar Leon is a 70y o  year old male        Review of Systems   Constitutional: Positive for fatigue  Respiratory: Negative  Cardiovascular: Negative  Musculoskeletal: Positive for gait problem           Past Medical History:   Diagnosis Date    Ambulatory dysfunction 1/25/2021    Aortic stenosis     Aortic valve disorder     Atrial fibrillation (HCC)     Cancer (HCC) 2000    Skin-Basal cell    Coronary artery disease Aortic valve 2000    Disease of thyroid gland Nodules    See Dr Ignacia Hensley Diverticulosis 1/25/2021    Functional urinary incontinence 1/25/2021    Heart murmur Congenetal    Surgectly repaired    Hypertension 2000    Take Metoprolol    Hypertriglyceridemia 1/25/2021    Left bundle branch block (LBBB)     [facility] sclerosis ([facility] )     Multiple sclerosis ([facility] ) 1/25/2021    Nail disorder 6/23/2021    Need for hepatitis A and B vaccination 6/23/2021    Obesity 2015    Prediabetes 1/25/2021    Urinary tract infection 2017    See Dr BELTRÁN [facility]     Social History     Substance and Sexual Activity   Alcohol Use Yes    Alcohol/week: 5 0 standard drinks    Types: 2 Glasses of wine, 2 Cans of beer, 1 Standard drinks or equivalent per week     Social History     Substance and Sexual Activity   Drug Use Never     Social History     Tobacco Use   Smoking Status Former Smoker    Packs/day: 1 00    Years: 25 00  Pack years: 25 00    Types: Cigarettes    Start date: 10/22/1968   Brianna Guillaume Quit date: 1/10/2000    Years since quittin 7   Smokeless Tobacco Never Used   Tobacco Comment    Former Smoker per [employer]       Allergies: Allergies   Allergen Reactions    Simvastatin-High Dose     Statins Other (See Comments)     CPK levels elevated       Medications:     Current Outpatient Medications:     aspirin 81 MG tablet, Take 1 tablet by mouth daily Every other day, Disp: , Rfl:     B Complex Vitamins (VITAMIN B COMPLEX) TABS, Take by mouth, Disp: , Rfl:     cetirizine (ZyrTEC) 10 mg tablet, Take 10 mg by mouth daily, Disp: , Rfl:     finasteride (PROSCAR) 5 mg tablet, Take 5 mg by mouth daily, Disp: , Rfl:     methenamine hippurate (HIPREX) 1 g tablet, Take by mouth TAKE 1/2 TAB EVERY 12 HOURS, Disp: , Rfl:     metoprolol succinate (TOPROL-XL) 100 mg 24 hr tablet, Take 1 tablet (100 mg total) by mouth daily, Disp: 90 tablet, Rfl: 3    multivitamin (THERAGRAN) TABS, Take 1 tablet by mouth daily, Disp: , Rfl:     Omega-3 Fatty Acids (FISH OIL) 1200 MG CAPS, Take by mouth, Disp: , Rfl:     prednisoLONE acetate (PRED FORTE) 1 % ophthalmic suspension, INSTILL 1 DROP INTO LEFT EYE 4 TIMES A DAY, Disp: , Rfl:     tamsulosin (FLOMAX) 0 4 mg, Take 0 4 mg by mouth daily with dinner, Disp: , Rfl:       Physical Exam  Constitutional:       Appearance: He is obese  Cardiovascular:      Rate and Rhythm: Normal rate and regular rhythm  Pulses: Normal pulses  Comments: Very short grade 1 murmur  Pulmonary:      Effort: Pulmonary effort is normal    Musculoskeletal:      Right lower leg: No edema  Left lower leg: No edema  Neurological:      Mental Status: He is alert     Psychiatric:         Behavior: Behavior normal            Laboratory Studies:  CMP:      Invalid input(s): ALBUMIN  NT-proBNP:   Lab Results   Component Value Date    NTBNP 45 2018      Coags:    Lipid Profile:   Lab Results   Component Value Date    CHOL 175 2014     Lab Results   Component Value Date    HDL 44 2021     Lab Results   Component Value Date    LDLCALC 115 (H) 2021     Lab Results   Component Value Date    TRIG 145 2021       Cardiac testing:     EKG reviewed personally:     Results for orders placed during the hospital encounter of 10/19/20    Echo complete with contrast if indicated    Narrative  [address]  [phone]    Transthoracic Echocardiogram  2D, M-mode, Doppler, and Color Doppler    Study date:  19-Oct-2020    Patient: Ester Loomis  MR number: [id number]  Account number: [de-identified]  : 1950  Age: 71 years  Gender: Male  Status: Outpatient  Location: [address] [place] [address] [facility]  Height: 70 in  Weight: 239 6 lb  BP: 138/ 76 mmHg    Indications: Aortic valve replacement    Diagnoses: I10  - Essential (primary) hypertension, I44 7 - Left bundle-branch block, unspecified    Sonographer:  Rachell Magaña BS, RDCS, RVT, RDMS  Primary Physician:  Felicity Rey MD  Referring Physician:  Samanta Garcia MD  Group:  [facility] [id number] [facility]  Cardiology Fellow: Elizabeth Rahman MD  Interpreting Physician:  Samanta Garcia MD    SUMMARY    LEFT VENTRICLE:  Systolic function was normal  Ejection fraction was estimated to be 55 %  There were no regional wall motion abnormalities  There was mild concentric hypertrophy  Features were consistent with a pseudonormal left ventricular filling pattern, with concomitant abnormal relaxation and increased filling pressure (grade 2 diastolic dysfunction)  VENTRICULAR SEPTUM:  There was "bounce" motion  These changes are consistent with a post-thoracotomy state  RIGHT VENTRICLE:  The size was normal   Systolic function was normal     MITRAL VALVE:  There was no significant perivalvular regurgitation  AORTIC VALVE:  A bioprosthesis was present   It exhibited trace central regurgitation, no paravalvular leak and otherwise normal function  Valve mean gradient was 10 8 mmHg  Estimated aortic valve area (by VTI) was 1 67 cmï¾²  TRICUSPID VALVE:  There was mild regurgitation  Estimated peak PA pressure was 35 mmHg  The findings suggest mild pulmonary hypertension  AORTA:  The root exhibited mild dilatation at 3 8 cm  COMPARISONS:  There has been no significant interval change  Comparison was made with the previous study of 12-Nov-2018  HISTORY: PRIOR HISTORY: LBBB, bioprosthetic AVR (for AS) with aortic root replacement 2013, HTN  PROCEDURE: The study was performed in the [address] [facility] [place]  This was a routine study  The transthoracic approach was used  The study included complete 2D imaging, M-mode, complete spectral Doppler, and color Doppler  Images were obtained from the parasternal, apical, subcostal, and suprasternal notch acoustic windows  Image quality was good  LEFT VENTRICLE: Size was normal  Systolic function was normal  Ejection fraction was estimated to be 55 %  There were no regional wall motion abnormalities  Wall thickness was mildly increased  There was mild concentric hypertrophy  DOPPLER: Features were consistent with a pseudonormal left ventricular filling pattern, with concomitant abnormal relaxation and increased filling pressure (grade 2 diastolic dysfunction)  VENTRICULAR SEPTUM: There was "bounce" motion  These changes are consistent with a post-thoracotomy state  RIGHT VENTRICLE: The size was normal  Systolic function was normal  The moderator band was in a normal position and normal-sized  LEFT ATRIUM: The atrium was mildly dilated  RIGHT ATRIUM: The atrium was mildly dilated  MITRAL VALVE: Valve structure was normal  There was normal leaflet separation  DOPPLER: The transmitral velocity was within the normal range  There was no evidence for stenosis  There was trace regurgitation   Regurgitation grade was less  than 1+ on a scale of 0 to 4+  There was no significant perivalvular regurgitation  The regurgitant jet was centrally directed  AORTIC VALVE: A bioprosthesis was present  It exhibited trace central regurgitation, no paravalvular leak and otherwise normal function  TRICUSPID VALVE: The valve structure was normal  There was normal leaflet separation  DOPPLER: The transtricuspid velocity was within the normal range  There was no evidence for stenosis  There was mild regurgitation  Estimated peak PA  pressure was 35 mmHg  The findings suggest mild pulmonary hypertension  PULMONIC VALVE: Leaflets exhibited normal thickness, no calcification, and normal cuspal separation  DOPPLER: The transpulmonic velocity was within the normal range  There was no evidence for stenosis  There was mild regurgitation  PERICARDIUM: There was no pericardial effusion  The pericardium was normal in appearance  AORTA: The root exhibited mild dilatation at 3 8 cm  SYSTEMIC VEINS: IVC: The inferior vena cava was normal in size and course   Respirophasic changes were normal     MEASUREMENT TABLES    2D MEASUREMENTS  LVOT   (Reference normals)  Diam   24 mm   (--)    DOPPLER MEASUREMENTS  LVOT   (Reference normals)  Peak joey   77 cm/s   (--)  Mean joey   52 cm/s   (--)  VTI   18 7 cm   (--)  Peak gradient   2 mmHg   (--)  Mean gradient   1 2 mmHg   (--)  Stroke vol   84 6 ml   (--)  Aortic valve   (Reference normals)  Peak joey   217 cm/s   (--)  Mean joey   154 cm/s   (--)  VTI   50 7 cm   (--)  Peak gradient   18 8 mmHg   (--)  Mean gradient   10 8 mmHg   (--)  Obstr index, VTI   0 37    (--)  Valve area, VTI   1 67 cmï¾²   (--)  Obstr index, Vmax   0 35    (--)  Valve area, Vmax   1 58 cmï¾²   (--)  Obstr index, Vmean   0 34    (--)  Valve area, Vmean   1 54 cmï¾²   (--)    SYSTEM MEASUREMENT TABLES    2D  %FS: 25 64 %  EDV(Teich): 129 97 ml  EF(Teich): 50 13 %  ESV(Teich): 64 82 ml  IVSd: 1 25 cm  LA Area: 20 68 cm2  LVEDV MOD A4C: 149 82 ml  LVEF MOD A4C: 52 65 %  LVESV MOD A4C: 70 94 ml  LVIDd: 5 21 cm  LVIDs: 3 87 cm  LVLd A4C: 9 05 cm  LVLs A4C: 7 39 cm  LVOT Diam: 2 4 cm  LVPWd: 1 07 cm  RA Area: 18 93 cm2  RVIDd: 3 96 cm  SV MOD A4C: 78 88 ml  SV(Teich): 65 15 ml    CW  AV Env  Ti: 329 84 ms  AV VTI: 50 72 cm  AV Vmax: 2 21 m/s  AV Vmean: 1 54 m/s  AV maxP 76 mmHg  AV meanPG: 10 76 mmHg  MV PHT: 111 52 ms  MV VTI: 46 17 cm  MV Vmax: 1 26 m/s  MV Vmean: 0 64 m/s  MV maxP 34 mmHg  MV meanP mmHg  MVA By PHT: 1 97 cm2  TR Vmax: 2 76 m/s  TR maxP 39 mmHg    MM  Ao Diam: 3 9 cm  LA Diam: 5 48 cm  LA/Ao: 1 4  TAPSE: 2 12 cm    PW  ZIGGY (VTI): 1 67 cm2  ZIGGY Vmax: 1 57 cm2  AVAI (VTI): 0 cm2/m2  AVAI Vmax: 0 cm2/m2  E' Lat: 0 1 m/s  E/E' Lat: 10 67  LVOT Env  Ti: 362 51 ms  LVOT VTI: 18 68 cm  LVOT Vmax: 0 77 m/s  LVOT Vmean: 0 52 m/s  LVOT maxP 35 mmHg  LVOT meanP 24 mmHg  LVSI Dopp: 37 43 ml/m2  LVSV Dopp: 84 59 ml  MV A [place]: 1 06 m/s  MV Dec [place]: 3 15 m/s2  MV DecT: 338 34 ms  MV E [place]: 1 07 m/s  MV E/A Ratio: 1  MVA (VTI): 1 83 cm2    [employer] Accredited Echocardiography Laboratory    Prepared and electronically signed by    Mustapha Monroe MD  Signed 19-Oct-2020 13:36:01    No results found for this or any previous visit  No results found for this or any previous visit  No results found for this or any previous visit  Mustapha Monroe MD    Portions of the record may have been created with voice recognition software   Occasional wrong word or "sound a like" substitutions may have occurred due to the inherent limitations of voice recognition software   Read the chart carefully and recognize, using context, where substitutions have occurred

## 2022-10-04 DIAGNOSIS — I10 HYPERTENSION, UNSPECIFIED TYPE: ICD-10-CM

## 2022-10-04 RX ORDER — METOPROLOL SUCCINATE 100 MG/1
100 TABLET, EXTENDED RELEASE ORAL DAILY
Qty: 90 TABLET | Refills: 2 | Status: SHIPPED | OUTPATIENT
Start: 2022-10-04

## 2022-10-31 ENCOUNTER — RA CDI HCC (OUTPATIENT)
Dept: OTHER | Facility: HOSPITAL | Age: 72
End: 2022-10-31

## 2022-11-07 ENCOUNTER — OFFICE VISIT (OUTPATIENT)
Dept: FAMILY MEDICINE CLINIC | Facility: CLINIC | Age: 72
End: 2022-11-07

## 2022-11-07 VITALS
WEIGHT: 221.4 LBS | SYSTOLIC BLOOD PRESSURE: 130 MMHG | TEMPERATURE: 98.6 F | BODY MASS INDEX: 32.79 KG/M2 | RESPIRATION RATE: 16 BRPM | HEIGHT: 69 IN | HEART RATE: 67 BPM | OXYGEN SATURATION: 98 % | DIASTOLIC BLOOD PRESSURE: 70 MMHG

## 2022-11-07 DIAGNOSIS — E78.1 HYPERTRIGLYCERIDEMIA: ICD-10-CM

## 2022-11-07 DIAGNOSIS — G35 MULTIPLE SCLEROSIS (HCC): ICD-10-CM

## 2022-11-07 DIAGNOSIS — Z00.00 ENCOUNTER FOR MEDICARE ANNUAL WELLNESS EXAM: ICD-10-CM

## 2022-11-07 DIAGNOSIS — E66.01 OBESITY, MORBID (HCC): ICD-10-CM

## 2022-11-07 DIAGNOSIS — Z23 ENCOUNTER FOR IMMUNIZATION: ICD-10-CM

## 2022-11-07 DIAGNOSIS — Z95.2 HEART VALVE REPLACED: ICD-10-CM

## 2022-11-07 DIAGNOSIS — I48.91 ATRIAL FIBRILLATION, UNSPECIFIED TYPE (HCC): Primary | ICD-10-CM

## 2022-11-07 NOTE — PROGRESS NOTES
Assessment and Plan:       Patient advised to get blood work  Pneumonia vaccine given today due to MS will get 1 every 5 years  Advised to get tetanus vaccine at the pharmacy  Meds refilled will see him back in 3 months sooner if needed  I have spent 30 minutes with Patient  today in which greater than 50% of this time was spent in counseling/coordination of care regarding Prognosis, Risks and benefits of tx options and Intructions for management  Problem List Items Addressed This Visit        Cardiovascular and Mediastinum    Atrial fibrillation ([facility] ) - Primary       Nervous and Auditory    Multiple sclerosis ([facility] )       Other    Hypertriglyceridemia    Obesity, morbid ([facility] )    Heart valve replaced      Other Visit Diagnoses     Encounter for immunization        Relevant Orders    Pneumococcal Conjugate Vaccine 20-valent (PCV20) (Completed)    Encounter for Medicare annual wellness exam            BMI Counseling: Body mass index is 32 7 kg/m²  The BMI is above normal  Nutrition recommendations include decreasing portion sizes, encouraging healthy choices of fruits and vegetables, limiting drinks that contain sugar and reducing intake of cholesterol  Exercise recommendations include exercising 3-5 times per week  No pharmacotherapy was ordered  Rationale for BMI follow-up plan is due to patient being overweight or obese  Falls Plan of Care: balance, strength, and gait training instructions were provided  Recommended assistive device to help with gait and balance  Patient assessed for orthostatic hypotension  Medications that increase falls were reviewed  Assessed feet and footwear  Vitamin D supplementation was recommended  Assessed visual acuity  Home safety evaluation by OT recommended  Cognitive screening performed  Home safety education provided         Preventive health issues were discussed with patient, and age appropriate screening tests were ordered as noted in patient's After Visit Summary  Personalized health advice and appropriate referrals for health education or preventive services given if needed, as noted in patient's After Visit Summary  History of Present Illness:     Patient presents for a Medicare Wellness Visit    41-year-old male follow-up  Hypertension, he is on metoprolol  He has recurrent urinary infection and incontinent due to MS he is on Flomax hip Wayne and Proscar  He follow-up with urologist   He follow-up cardiologist for history aortic valve replacement 2013 with Bovie prosthetic valve  He developed AFib after taking Betaseron from MS now he is sinus rhythm he is on baby aspirin  He follow-up with cardiologist yearly  He sees neurologist for MS is been stable he try Betaseron get Afib,  teclidera concern for PML,  tried Copaxone had ISR and tried Ocrevus and had multiple infection  Currently he  maintain off medicine he knows his condition and he ambulate with a cane and with electric wheelchair  He enjoys going to [employer] game he has 1 daughter 36years old  He had cataract surgery his left eye is doing well  Up-to-date with COVID vaccine and booster  Patient Care Team:  Lyudmila Reveles MD as PCP - General (Family Medicine)  MD Irene Marcus MD     Review of Systems:     Review of Systems   Constitutional: Negative for activity change, appetite change, fatigue, fever and unexpected weight change  HENT: Negative for dental problem and trouble swallowing  Eyes: Negative for photophobia and visual disturbance  Respiratory: Negative for cough and chest tightness  Cardiovascular: Negative for chest pain, palpitations and leg swelling  Gastrointestinal: Negative for abdominal pain, constipation and vomiting  Endocrine: Negative for cold intolerance, polydipsia and polyuria  Genitourinary: Negative for difficulty urinating, frequency and urgency     Musculoskeletal: Negative for arthralgias, joint swelling, myalgias and neck pain  Skin: Negative for color change, rash and wound  Allergic/Immunologic: Negative for environmental allergies  Neurological: Negative for dizziness, weakness and numbness  Hematological: Does not bruise/bleed easily  Psychiatric/Behavioral: Negative for decreased concentration, dysphoric mood, self-injury, sleep disturbance and suicidal ideas          Problem List:     Patient Active Problem List   Diagnosis   • Multiple sclerosis ([place] [address] )   • Left bundle branch block (LBBB)   • Atrial fibrillation (HCC)   • Aortic valve disorder   • Aortic stenosis   • Prediabetes   • Functional urinary incontinence   • Diverticulosis   • Ambulatory dysfunction   • Hypertriglyceridemia   • Thoracic aortic aneurysm without rupture   • Obesity, morbid ([place] [address] )   • Vitamin D deficiency   • Memory loss   • Heart valve replaced   • Generalized weakness   • Diverticulitis   • Depression   • Abnormal gait   • Nail disorder   • Need for hepatitis A and B vaccination   • Cataract of left eye      Past Medical and Surgical History:     Past Medical History:   Diagnosis Date   • Ambulatory dysfunction 1/25/2021   • Aortic stenosis    • Aortic valve disorder    • Atrial fibrillation ([place] [address] )    • Cancer ([place] [address] ) 2000    Skin-Basal cell   • Coronary artery disease Aortic valve 2000   • Disease of thyroid gland Nodules    See Dr Griselda Chawla   • Diverticulosis 1/25/2021   • Functional urinary incontinence 1/25/2021   • Heart murmur Congenetal    Surgectly repaired   • Hypertension 2000    Take Metoprolol   • Hypertriglyceridemia 1/25/2021   • Left bundle branch block (LBBB)    • Multiple sclerosis ([facility] )    • Multiple sclerosis ([place] [address] ) 1/25/2021   • Nail disorder 6/23/2021   • Need for hepatitis A and B vaccination 6/23/2021   • Obesity 2015   • Prediabetes 1/25/2021   • Urinary tract infection 2017    See Dr BELTRÁN [facility]     Past Surgical History:   Procedure Laterality Date   • AORTIC VALVE REPAIR  04/2013    ascending and transverse aorta repair per [place]   • AORTIC VALVE REPLACEMENT      mechanical repair per [facility]   • CARDIAC SURGERY  [facility]  [facility]   • CARDIAC VALVE REPLACEMENT  [id number]    Valve and Aortic root   • CERVICAL SPINE SURGERY  2013    C4-C5 anterior cervical discectomy and fusion per [place]   • COLONOSCOPY  03/15/2018    3 years   • COLONOSCOPY  06/10/2014    5 years    • LYMPH NODE BIOPSY     • RETINAL LASER PROCEDURE  2018    tear repair per [facility]   • SPINE SURGERY      C4=5 Fusion [facility]      Family History:     Family History   Problem Relation Age of Onset   • Skin cancer Family    • Heart Valve Disease Family    • Valvular heart disease Mother         mitral valve disorder   • Melanoma Father    • Cancer Father         Melanoma   • Other Sister         cerebral meningioma      Social History:     Social History     Socioeconomic History   • Marital status: /Civil Union     Spouse name: None   • Number of children: None   • Years of education: None   • Highest education level: None   Occupational History   • None   Tobacco Use   • Smoking status: Former Smoker     Packs/day: 1 00     Years: 25 00     Pack years: 25 00     Types: Cigarettes     Start date: 10/22/1968     Quit date: 1/10/2000     Years since quittin 8   • Smokeless tobacco: Never Used   • Tobacco comment: Former Smoker per [employer]   • Vaping Use: Never used   Substance and Sexual Activity   • Alcohol use:  Yes     Alcohol/week: 5 0 standard drinks     Types: 2 Glasses of wine, 2 Cans of beer, 1 Standard drinks or equivalent per week   • Drug use: Never   • Sexual activity: Not Currently     Partners: Female     Birth control/protection: None   Other Topics Concern   • None   Social History Narrative    · Most recent tobacco use screenin2019      · Do you currently or have you served in the [employer]:   No      · Advance directive:   Yes     Per [facility]     Social Determinants of Health     Financial Resource Strain: Low Risk    • Difficulty of Paying Living Expenses: Not hard at all   Food Insecurity: Not on file   Transportation Needs: No Transportation Needs   • Lack of Transportation (Medical): No   • Lack of Transportation (Non-Medical): No   Physical Activity: Not on file   Stress: Not on file   Social Connections: Not on file   Intimate Partner Violence: Not on file   Housing Stability: Not on file      Medications and Allergies:     Current Outpatient Medications   Medication Sig Dispense Refill   • aspirin 81 MG tablet Take 1 tablet by mouth daily Every other day     • B Complex Vitamins (VITAMIN B COMPLEX) TABS Take by mouth     • cetirizine (ZyrTEC) 10 mg tablet Take 10 mg by mouth daily     • finasteride (PROSCAR) 5 mg tablet Take 5 mg by mouth daily     • methenamine hippurate (HIPREX) 1 g tablet Take 1 g by mouth TAKE 1 in AM and 1/2 TAB PM     • metoprolol succinate (TOPROL-XL) 100 mg 24 hr tablet Take 1 tablet (100 mg total) by mouth daily  90 tablet 2   • multivitamin (THERAGRAN) TABS Take 1 tablet by mouth daily     • Omega-3 Fatty Acids (FISH OIL) 1200 MG CAPS Take by mouth     • tamsulosin (FLOMAX) 0 4 mg Take 0 4 mg by mouth daily with dinner       No current facility-administered medications for this visit       Allergies   Allergen Reactions   • Simvastatin-High Dose    • Statins Other (See Comments)     CPK levels elevated      Immunizations:     Immunization History   Administered Date(s) Administered   • COVID-19 MODERNA VACC 0 5 ML IM 02/12/2021, 03/12/2021, 11/02/2021   • INFLUENZA 03/20/2014, 10/24/2022   • Influenza Split High Dose Preservative Free IM 11/01/2016, 10/10/2017   • Influenza, high dose seasonal 0 7 mL 10/06/2020   • Pneumococcal Conjugate Vaccine 20-valent (Pcv20), Polysace 11/07/2022   • Tdap 02/08/2012   • Zoster Vaccine Recombinant 03/06/2020, 06/23/2020      Health Maintenance:         Topic Date Due   • Colorectal Cancer Screening  03/15/2021 • Hepatitis C Screening  Completed         Topic Date Due   • COVID-19 Vaccine (4 - Booster for Moderna series) 03/02/2022      Medicare Screening Tests and Risk Assessments:     Last Medicare Wellness visit information reviewed, patient interviewed and updates made to the record today  Health Risk Assessment:   Patient rates overall health as fair  Patient feels that their physical health rating is slightly worse  Patient is satisfied with their life  Eyesight was rated as same  Hearing was rated as same  Patient feels that their emotional and mental health rating is same  Patients states they are sometimes angry  Patient states they are often unusually tired/fatigued  Pain experienced in the last 7 days has been none  Patient states that he has experienced no weight loss or gain in last 6 months  Depression Screening:   PHQ-9 Score: 4      Fall Risk Screening: In the past year, patient has experienced: no history of falling in past year      Home Safety:  Patient does not have trouble with stairs inside or outside of their home  Patient has working smoke alarms and has working carbon monoxide detector  Home safety hazards include: none  Nutrition:   Current diet is Regular and Limited junk food  Medications:   Patient is currently taking over-the-counter supplements  OTC medications include: see medication list  Patient is able to manage medications  Activities of Daily Living (ADLs)/Instrumental Activities of Daily Living (IADLs):   Walk and transfer into and out of bed and chair?: Yes  Dress and groom yourself?: Yes    Bathe or shower yourself?: Yes    Feed yourself?  Yes  Do your laundry/housekeeping?: Yes  Manage your money, pay your bills and track your expenses?: Yes  Make your own meals?: Yes    Do your own shopping?: Yes    Previous Hospitalizations:   Any hospitalizations or ED visits within the last 12 months?: No      Advance Care Planning:   Living will: Yes    Durable POA for healthcare: Yes    Advanced directive: Yes      Cognitive Screening:   Provider or family/friend/caregiver concerned regarding cognition?: No    PREVENTIVE SCREENINGS      Cardiovascular Screening:    General: History Lipid Disorder and Risks and Benefits Discussed    Due for: Lipid Panel      Diabetes Screening:     General: Risks and Benefits Discussed    Due for: Blood Glucose      Colorectal Cancer Screening:     General: Screening Current      Prostate Cancer Screening:    General: Screening Current and Risks and Benefits Discussed      Osteoporosis Screening:    General: Risks and Benefits Discussed      Abdominal Aortic Aneurysm (AAA) Screening:    Risk factors include: age between 73-69 yo and tobacco use        General: Risks and Benefits Discussed and Screening Current      Lung Cancer Screening:     General: Screening Not Indicated and Risks and Benefits Discussed      Hepatitis C Screening:    General: Screening Current and Risks and Benefits Discussed    Screening, Brief Intervention, and Referral to Treatment (SBIRT)    Screening  Typical number of drinks in a day: 0  Typical number of drinks in a week: 0  Interpretation: Low risk drinking behavior  AUDIT-C Screenin) How often did you have a drink containing alcohol in the past year? never  2) How many drinks did you have on a typical day when you were drinking in the past year? 0  3) How often did you have 6 or more drinks on one occasion in the past year? never    AUDIT-C Score: 0  Interpretation: Score 0-3 (male): Negative screen for alcohol misuse    Single Item Drug Screening:  How often have you used an illegal drug (including marijuana) or a prescription medication for non-medical reasons in the past year? never    Single Item Drug Screen Score: 0  Interpretation: Negative screen for possible drug use disorder    Brief Intervention  Alcohol & drug use screenings were reviewed  No concerns regarding substance use disorder identified  Healthy alcohol use/limits discussed  Other Counseling Topics:   Car/seat belt/driving safety, skin self-exam, sunscreen and calcium and vitamin D intake and regular weightbearing exercise  No exam data present     Physical Exam:     /70 (BP Location: Left arm, Patient Position: Sitting, Cuff Size: Large)   Pulse 67   Temp 98 6 °F (37 °C) (Temporal)   Resp 16   Ht 5' 9" (1 753 m)   Wt 100 kg (221 lb 6 4 oz)   SpO2 98%   BMI 32 70 kg/m²     Physical Exam  Vitals and nursing note reviewed  Constitutional:       Appearance: Normal appearance  He is well-developed  HENT:      Head: Normocephalic and atraumatic  Right Ear: Tympanic membrane, ear canal and external ear normal       Left Ear: Tympanic membrane, ear canal and external ear normal       Nose: Nose normal       Mouth/Throat:      Mouth: Mucous membranes are moist       Pharynx: Oropharynx is clear  Eyes:      Extraocular Movements: Extraocular movements intact  Conjunctiva/sclera: Conjunctivae normal       Pupils: Pupils are equal, round, and reactive to light  Cardiovascular:      Rate and Rhythm: Normal rate and regular rhythm  Pulses: Normal pulses  Heart sounds: Normal heart sounds  No murmur heard  Pulmonary:      Effort: Pulmonary effort is normal  No respiratory distress  Breath sounds: Normal breath sounds  Abdominal:      General: Abdomen is flat  Bowel sounds are normal       Palpations: Abdomen is soft  Tenderness: There is no abdominal tenderness  Musculoskeletal:      Cervical back: Normal range of motion and neck supple  Comments: Abnormal gait due to MS ambulate with a cane   Skin:     General: Skin is warm and dry  Capillary Refill: Capillary refill takes less than 2 seconds  Neurological:      General: No focal deficit present  Mental Status: He is alert and oriented to person, place, and time  Mental status is at baseline     Psychiatric:         Mood and Affect: Mood normal          Behavior: Behavior normal          Thought Content:  Thought content normal          Judgment: Judgment normal           Lyudmila Carter MD

## 2023-01-10 ENCOUNTER — APPOINTMENT (OUTPATIENT)
Dept: LAB | Facility: MEDICAL CENTER | Age: 73
End: 2023-01-10

## 2023-01-10 DIAGNOSIS — N40.1 ENLARGED PROSTATE WITH URINARY RETENTION: ICD-10-CM

## 2023-01-10 DIAGNOSIS — N39.0 URINARY TRACT INFECTION WITHOUT HEMATURIA, SITE UNSPECIFIED: ICD-10-CM

## 2023-01-10 DIAGNOSIS — R33.8 ENLARGED PROSTATE WITH URINARY RETENTION: ICD-10-CM

## 2023-01-10 LAB
BUN SERPL-MCNC: 22 MG/DL (ref 5–25)
CREAT SERPL-MCNC: 1.05 MG/DL (ref 0.6–1.3)
GFR SERPL CREATININE-BSD FRML MDRD: 70 ML/MIN/1.73SQ M
PSA SERPL-MCNC: 1.4 NG/ML (ref 0–4)

## 2023-01-23 ENCOUNTER — RA CDI HCC (OUTPATIENT)
Dept: OTHER | Facility: HOSPITAL | Age: 73
End: 2023-01-23

## 2023-01-30 ENCOUNTER — OFFICE VISIT (OUTPATIENT)
Dept: FAMILY MEDICINE CLINIC | Facility: CLINIC | Age: 73
End: 2023-01-30

## 2023-01-30 VITALS
SYSTOLIC BLOOD PRESSURE: 130 MMHG | BODY MASS INDEX: 36.17 KG/M2 | DIASTOLIC BLOOD PRESSURE: 80 MMHG | HEIGHT: 69 IN | TEMPERATURE: 98.7 F | WEIGHT: 244.2 LBS | HEART RATE: 75 BPM | RESPIRATION RATE: 16 BRPM | OXYGEN SATURATION: 98 %

## 2023-01-30 DIAGNOSIS — I48.91 ATRIAL FIBRILLATION, UNSPECIFIED TYPE (HCC): Primary | ICD-10-CM

## 2023-01-30 DIAGNOSIS — I35.9 AORTIC VALVE DISORDER: ICD-10-CM

## 2023-01-30 DIAGNOSIS — R26.2 AMBULATORY DYSFUNCTION: ICD-10-CM

## 2023-01-30 DIAGNOSIS — R26.9 ABNORMAL GAIT: ICD-10-CM

## 2023-01-30 DIAGNOSIS — E66.01 OBESITY, MORBID (HCC): ICD-10-CM

## 2023-01-30 DIAGNOSIS — R39.81 FUNCTIONAL URINARY INCONTINENCE: ICD-10-CM

## 2023-01-30 DIAGNOSIS — G35 MULTIPLE SCLEROSIS (HCC): ICD-10-CM

## 2023-01-30 DIAGNOSIS — I10 ESSENTIAL HYPERTENSION: ICD-10-CM

## 2023-01-30 DIAGNOSIS — Z95.2 HEART VALVE REPLACED: ICD-10-CM

## 2023-01-30 DIAGNOSIS — E78.1 HYPERTRIGLYCERIDEMIA: ICD-10-CM

## 2023-01-30 DIAGNOSIS — I71.20 THORACIC AORTIC ANEURYSM WITHOUT RUPTURE, UNSPECIFIED PART: ICD-10-CM

## 2023-01-30 DIAGNOSIS — R73.03 PREDIABETES: ICD-10-CM

## 2023-01-30 PROBLEM — Z23 NEED FOR HEPATITIS A AND B VACCINATION: Status: RESOLVED | Noted: 2021-06-23 | Resolved: 2023-01-30

## 2023-01-30 NOTE — PROGRESS NOTES
Name: Nora Amato      : 1950      MRN: [id number]  Encounter Provider: Angel Bhatt MD  Encounter Date: 2023   Encounter department: [facility] cardio and neuro  Need blood work  Advised for tdap at pharmacy  Offer podiatry eval for nail care/cut=with MS worried about dexterity to cut nails  Fu in 6 m    I have spent 30 minutes with Patient  today in which greater than 50% of this time was spent in counseling/coordination of care regarding Prognosis, Risks and benefits of tx options and Intructions for management  1  Atrial fibrillation, unspecified type ([facility] [address] )  -     CBC and differential; Future    2  Aortic valve disorder    3  Thoracic aortic aneurysm without rupture, unspecified part    4  Multiple sclerosis ([facility] )    5  Prediabetes    6  Functional urinary incontinence  -     UA w Reflex to Microscopic w Reflex to Culture    7  Ambulatory dysfunction    8  Heart valve replaced    9  Abnormal gait    10  Hypertriglyceridemia  -     Lipid Panel with Direct LDL reflex; Future  -     TSH, 3rd generation with Free T4 reflex; Future    11  Essential hypertension  -     Comprehensive metabolic panel; Future  -     Uric acid; Future  -     Microalbumin,Urine    12  Obesity, morbid ([facility] [address] )         Subjective      66 yo M fu bp  Has MS fu w neuro=couldn't tolerate s/e of treatment so he's just being monitor=has a lot of balance and gait issue=ambulate w cane  Fu w uro for incontinent due to [address]  Fu w cardio=h/o heart sx  Still able to cut his own toenails  Compliance w med  Recent psa normal    Review of Systems   Constitutional: Negative for activity change, appetite change, fatigue, fever and unexpected weight change  HENT: Negative for dental problem and trouble swallowing  Eyes: Negative for photophobia and visual disturbance  Respiratory: Negative for cough and chest tightness      Cardiovascular: Negative for chest pain, palpitations and leg swelling  Gastrointestinal: Negative for abdominal pain, constipation and vomiting  Endocrine: Negative for cold intolerance, polydipsia and polyuria  Genitourinary: Negative for difficulty urinating, frequency and urgency  Musculoskeletal: Negative for arthralgias, joint swelling, myalgias and neck pain  Skin: Negative for color change, rash and wound  Allergic/Immunologic: Negative for environmental allergies  Neurological: Negative for dizziness, weakness and numbness  Hematological: Does not bruise/bleed easily  Psychiatric/Behavioral: Negative for decreased concentration, dysphoric mood, self-injury, sleep disturbance and suicidal ideas  Current Outpatient Medications on File Prior to Visit   Medication Sig   • aspirin 81 MG tablet Take 1 tablet by mouth daily Every other day   • B Complex Vitamins (VITAMIN B COMPLEX) TABS Take by mouth   • cetirizine (ZyrTEC) 10 mg tablet Take 10 mg by mouth daily   • finasteride (PROSCAR) 5 mg tablet Take 5 mg by mouth daily   • methenamine hippurate (HIPREX) 1 g tablet Take 1 g by mouth TAKE 1 in AM and 1/2 TAB PM   • metoprolol succinate (TOPROL-XL) 100 mg 24 hr tablet Take 1 tablet (100 mg total) by mouth daily  • multivitamin (THERAGRAN) TABS Take 1 tablet by mouth daily   • Omega-3 Fatty Acids (FISH OIL) 1200 MG CAPS Take by mouth   • tamsulosin (FLOMAX) 0 4 mg Take 0 4 mg by mouth daily with dinner       Objective     /80 (BP Location: Left arm, Patient Position: Sitting, Cuff Size: Large)   Pulse 75   Temp 98 7 °F (37 1 °C) (Temporal)   Resp 16   Ht 5' 9" (1 753 m)   Wt 111 kg (244 lb 3 2 oz)   SpO2 98%   BMI 36 06 kg/m²     Physical Exam  Vitals and nursing note reviewed  Constitutional:       Appearance: Normal appearance  He is well-developed  He is obese  HENT:      Head: Normocephalic and atraumatic        Right Ear: Tympanic membrane, ear canal and external ear normal       Left Ear: Tympanic membrane, ear canal and external ear normal       Nose: Nose normal       Mouth/Throat:      Mouth: Mucous membranes are moist       Pharynx: Oropharynx is clear  Eyes:      Extraocular Movements: Extraocular movements intact  Pupils: Pupils are equal, round, and reactive to light  Cardiovascular:      Rate and Rhythm: Normal rate and regular rhythm  Pulses: Normal pulses  Heart sounds: Normal heart sounds  Pulmonary:      Effort: Pulmonary effort is normal       Breath sounds: Normal breath sounds  Abdominal:      General: Abdomen is flat  Bowel sounds are normal       Palpations: Abdomen is soft  Musculoskeletal:         General: Normal range of motion  Cervical back: Normal range of motion and neck supple  Comments: Weak strength BL LE   Skin:     General: Skin is warm and dry  Capillary Refill: Capillary refill takes less than 2 seconds  Neurological:      General: No focal deficit present  Mental Status: He is alert and oriented to person, place, and time  Mental status is at baseline  Psychiatric:         Mood and Affect: Mood normal          Behavior: Behavior normal          Thought Content:  Thought content normal          Judgment: Judgment normal        Lyudmila Mujica MD

## 2023-03-07 ENCOUNTER — APPOINTMENT (OUTPATIENT)
Dept: LAB | Facility: MEDICAL CENTER | Age: 73
End: 2023-03-07

## 2023-03-07 DIAGNOSIS — I10 HYPERTENSION, UNSPECIFIED TYPE: ICD-10-CM

## 2023-03-07 DIAGNOSIS — I44.7 LBBB (LEFT BUNDLE BRANCH BLOCK): ICD-10-CM

## 2023-03-07 DIAGNOSIS — Z01.810 PREOP CARDIOVASCULAR EXAM: ICD-10-CM

## 2023-03-07 DIAGNOSIS — I35.9 AORTIC VALVE DISORDER: ICD-10-CM

## 2023-03-07 DIAGNOSIS — I10 ESSENTIAL HYPERTENSION: ICD-10-CM

## 2023-03-07 DIAGNOSIS — I48.91 ATRIAL FIBRILLATION, UNSPECIFIED TYPE (HCC): ICD-10-CM

## 2023-03-07 DIAGNOSIS — Z95.4 STATUS POST COMBINED AORTIC ROOT AND VALVE REPLACEMENT USING STENTLESS BIOPROSTHETIC AORTIC VALVE: ICD-10-CM

## 2023-03-07 DIAGNOSIS — Z95.2 STATUS POST COMBINED AORTIC ROOT AND VALVE REPLACEMENT USING STENTLESS BIOPROSTHETIC AORTIC VALVE: ICD-10-CM

## 2023-03-07 DIAGNOSIS — E78.1 HYPERTRIGLYCERIDEMIA: ICD-10-CM

## 2023-03-07 LAB
ALBUMIN SERPL BCP-MCNC: 3.8 G/DL (ref 3.5–5)
ALP SERPL-CCNC: 63 U/L (ref 46–116)
ALT SERPL W P-5'-P-CCNC: 43 U/L (ref 12–78)
ANION GAP SERPL CALCULATED.3IONS-SCNC: 4 MMOL/L (ref 4–13)
AST SERPL W P-5'-P-CCNC: 28 U/L (ref 5–45)
BASOPHILS # BLD AUTO: 0.07 THOUSANDS/ÂΜL (ref 0–0.1)
BASOPHILS NFR BLD AUTO: 1 % (ref 0–1)
BILIRUB SERPL-MCNC: 0.72 MG/DL (ref 0.2–1)
BILIRUB UR QL STRIP: NEGATIVE
BUN SERPL-MCNC: 25 MG/DL (ref 5–25)
CALCIUM SERPL-MCNC: 8.7 MG/DL (ref 8.3–10.1)
CHLORIDE SERPL-SCNC: 103 MMOL/L (ref 96–108)
CHOLEST SERPL-MCNC: 169 MG/DL
CLARITY UR: CLEAR
CO2 SERPL-SCNC: 26 MMOL/L (ref 21–32)
COLOR UR: NORMAL
CREAT SERPL-MCNC: 1.11 MG/DL (ref 0.6–1.3)
CREAT UR-MCNC: 44.6 MG/DL
EOSINOPHIL # BLD AUTO: 0.2 THOUSAND/ÂΜL (ref 0–0.61)
EOSINOPHIL NFR BLD AUTO: 2 % (ref 0–6)
ERYTHROCYTE [DISTWIDTH] IN BLOOD BY AUTOMATED COUNT: 13.2 % (ref 11.6–15.1)
GFR SERPL CREATININE-BSD FRML MDRD: 65 ML/MIN/1.73SQ M
GLUCOSE P FAST SERPL-MCNC: 77 MG/DL (ref 65–99)
GLUCOSE UR STRIP-MCNC: NEGATIVE MG/DL
HCT VFR BLD AUTO: 44.2 % (ref 36.5–49.3)
HDLC SERPL-MCNC: 44 MG/DL
HGB BLD-MCNC: 15 G/DL (ref 12–17)
HGB UR QL STRIP.AUTO: NEGATIVE
IMM GRANULOCYTES # BLD AUTO: 0.03 THOUSAND/UL (ref 0–0.2)
IMM GRANULOCYTES NFR BLD AUTO: 0 % (ref 0–2)
KETONES UR STRIP-MCNC: NEGATIVE MG/DL
LDLC SERPL CALC-MCNC: 95 MG/DL (ref 0–100)
LEUKOCYTE ESTERASE UR QL STRIP: NEGATIVE
LYMPHOCYTES # BLD AUTO: 2.94 THOUSANDS/ÂΜL (ref 0.6–4.47)
LYMPHOCYTES NFR BLD AUTO: 30 % (ref 14–44)
MCH RBC QN AUTO: 31.3 PG (ref 26.8–34.3)
MCHC RBC AUTO-ENTMCNC: 33.9 G/DL (ref 31.4–37.4)
MCV RBC AUTO: 92 FL (ref 82–98)
MICROALBUMIN UR-MCNC: 8.6 MG/L (ref 0–20)
MICROALBUMIN/CREAT 24H UR: 19 MG/G CREATININE (ref 0–30)
MONOCYTES # BLD AUTO: 1.03 THOUSAND/ÂΜL (ref 0.17–1.22)
MONOCYTES NFR BLD AUTO: 10 % (ref 4–12)
NEUTROPHILS # BLD AUTO: 5.66 THOUSANDS/ÂΜL (ref 1.85–7.62)
NEUTS SEG NFR BLD AUTO: 57 % (ref 43–75)
NITRITE UR QL STRIP: NEGATIVE
NRBC BLD AUTO-RTO: 0 /100 WBCS
PH UR STRIP.AUTO: 6 [PH]
PLATELET # BLD AUTO: 216 THOUSANDS/UL (ref 149–390)
PMV BLD AUTO: 10.3 FL (ref 8.9–12.7)
POTASSIUM SERPL-SCNC: 4.3 MMOL/L (ref 3.5–5.3)
PROT SERPL-MCNC: 6.8 G/DL (ref 6.4–8.4)
PROT UR STRIP-MCNC: NEGATIVE MG/DL
RBC # BLD AUTO: 4.79 MILLION/UL (ref 3.88–5.62)
SODIUM SERPL-SCNC: 133 MMOL/L (ref 135–147)
SP GR UR STRIP.AUTO: 1.01 (ref 1–1.03)
TRIGL SERPL-MCNC: 152 MG/DL
TSH SERPL DL<=0.05 MIU/L-ACNC: 1.47 UIU/ML (ref 0.45–4.5)
URATE SERPL-MCNC: 6.3 MG/DL (ref 3.5–8.5)
UROBILINOGEN UR STRIP-ACNC: <2 MG/DL
WBC # BLD AUTO: 9.93 THOUSAND/UL (ref 4.31–10.16)

## 2023-06-28 ENCOUNTER — OFFICE VISIT (OUTPATIENT)
Dept: FAMILY MEDICINE CLINIC | Facility: CLINIC | Age: 73
End: 2023-06-28
Payer: MEDICARE

## 2023-06-28 VITALS
BODY MASS INDEX: 35.84 KG/M2 | DIASTOLIC BLOOD PRESSURE: 80 MMHG | RESPIRATION RATE: 16 BRPM | OXYGEN SATURATION: 98 % | HEART RATE: 68 BPM | WEIGHT: 242 LBS | TEMPERATURE: 98.6 F | HEIGHT: 69 IN | SYSTOLIC BLOOD PRESSURE: 140 MMHG

## 2023-06-28 DIAGNOSIS — Z86.79 HISTORY OF AORTIC ANEURYSM REPAIR: ICD-10-CM

## 2023-06-28 DIAGNOSIS — R26.9 ABNORMAL GAIT: ICD-10-CM

## 2023-06-28 DIAGNOSIS — I35.9 AORTIC VALVE DISORDER: ICD-10-CM

## 2023-06-28 DIAGNOSIS — Z12.11 COLON CANCER SCREENING: ICD-10-CM

## 2023-06-28 DIAGNOSIS — I48.91 ATRIAL FIBRILLATION, UNSPECIFIED TYPE (HCC): ICD-10-CM

## 2023-06-28 DIAGNOSIS — G35 MULTIPLE SCLEROSIS (HCC): ICD-10-CM

## 2023-06-28 DIAGNOSIS — R73.03 PREDIABETES: Primary | ICD-10-CM

## 2023-06-28 DIAGNOSIS — Z95.2 HISTORY OF AORTIC VALVE REPLACEMENT: ICD-10-CM

## 2023-06-28 DIAGNOSIS — Z98.890 HISTORY OF AORTIC ANEURYSM REPAIR: ICD-10-CM

## 2023-06-28 NOTE — PROGRESS NOTES
Assessment/Plan:    1  Prediabetes    2  Multiple sclerosis ([facility] [address] )  Comments:  managed by neurology    3  Atrial fibrillation, unspecified type [facility])  Comments:  managed by [address] [place] [address] Cardiology    4  Colon cancer screening  -     Ambulatory Referral to Gastroenterology; Future    5  Aortic valve disorder    6  History of aortic aneurysm repair    7  History of aortic valve replacement    8  Abnormal gait        The case discussed with patient using patient centered shared decision making  The patient was counseled regarding instructions for management,-- risk factor reductions,-- prognosis,-- impressions,-- risks and benefits of treatment options,-- importance of compliance with treatment  I have reviewed the instructions with the patient, answering all questions to his satisfaction  Patient clinically stable at this time  Cont with current plan of care  Will follow along with specialists  RTO as recommended and PRN        Return in about 6 months (around 12/28/2023)  I have spent a total time of 35 minutes on 06/28/23 in caring for this patient including Diagnostic results, Prognosis, Risks and benefits of tx options, Instructions for management, Patient and family education, Importance of tx compliance, Risk factor reductions, Impressions, Counseling / Coordination of care, Documenting in the medical record, Reviewing / ordering tests, medicine, procedures   and Obtaining or reviewing history    Subjective:      Patient ID: Josue Mesa is a 67 y o  male  Chief Complaint   Patient presents with   • Establish Care     Establish Care       66 yo male with h/o MS, aortic root aneurysm repair and AVR (at [place]), PAF, presents to establish care    Reports in usual state of health  Sees cardio, neuro regularly    His MS is gradually progressing  He has tried multiple meds to delay progression however has opted to forego treatment at this time as the side effects were more problematic than the s/s of MS  He feels his MS is stable  He uses a cane  He knows his limits    He is due for labs  He has no c/o today    Reviewed medical history in detail  Changes to medical record changed where appropriate  Reviewed medications  Patient taking as prescribed  Tolerating well  Denies Side effects  Reviewed recent visits with specialists co-managing chronic conditions  The following portions of the patient's history were reviewed and updated as appropriate: allergies, current medications, past family history, past medical history, past social history, past surgical history and problem list     Review of Systems   Constitutional: Negative  HENT: Negative for trouble swallowing  Respiratory: Negative for cough and shortness of breath  Cardiovascular: Negative  Gastrointestinal: Negative for abdominal pain and blood in stool  Genitourinary: Negative for difficulty urinating and dysuria  Musculoskeletal: Positive for arthralgias and gait problem  Skin: Negative for color change and pallor  Neurological: Positive for weakness  Negative for dizziness, seizures, syncope and headaches  Hematological: Does not bruise/bleed easily  Psychiatric/Behavioral: Negative for dysphoric mood, sleep disturbance and suicidal ideas  The patient is not nervous/anxious  Current Outpatient Medications   Medication Sig Dispense Refill   • aspirin 81 MG tablet Take 1 tablet by mouth daily Every other day     • B Complex Vitamins (VITAMIN B COMPLEX) TABS Take by mouth     • cetirizine (ZyrTEC) 10 mg tablet Take 10 mg by mouth daily     • finasteride (PROSCAR) 5 mg tablet Take 5 mg by mouth daily     • methenamine hippurate (HIPREX) 1 g tablet Take 1 g by mouth TAKE 1 in AM and 1/2 TAB PM     • metoprolol succinate (TOPROL-XL) 100 mg 24 hr tablet Take 1 tablet (100 mg total) by mouth daily   90 tablet 2   • multivitamin (THERAGRAN) TABS Take 1 tablet by mouth daily     • Omega-3 Fatty Acids (FISH OIL) 1200 MG CAPS "Take by mouth     • tamsulosin (FLOMAX) 0 4 mg Take 0 4 mg by mouth daily with dinner       No current facility-administered medications for this visit  Patient Active Problem List   Diagnosis   • Multiple sclerosis ([facility] )   • Left bundle branch block (LBBB)   • Atrial fibrillation (HCC)   • Aortic valve disorder   • Aortic stenosis   • Prediabetes   • Functional urinary incontinence   • Diverticulosis   • Ambulatory dysfunction   • Hypertriglyceridemia   • Thoracic aortic aneurysm without rupture ([facility] )   • Obesity, morbid ([place] [address] )   • Vitamin D deficiency   • Memory loss   • Heart valve replaced   • Generalized weakness   • Diverticulitis   • Depression   • Abnormal gait   • Nail disorder   • Cataract of left eye       Objective:    /80 (BP Location: Left arm, Patient Position: Sitting, Cuff Size: Large)   Pulse 68   Temp 98 6 °F (37 °C) (Temporal)   Resp 16   Ht 5' 9\" (1 753 m)   Wt 110 kg (242 lb)   SpO2 98%   BMI 35 74 kg/m²        Physical Exam  Vitals and nursing note reviewed  Constitutional:       General: He is not in acute distress  Appearance: Normal appearance  Neck:      Vascular: No carotid bruit  Cardiovascular:      Rate and Rhythm: Normal rate and regular rhythm  Pulses: Normal pulses  Heart sounds: Murmur heard  Pulmonary:      Effort: Pulmonary effort is normal       Breath sounds: Normal breath sounds  Abdominal:      Palpations: Abdomen is soft  Tenderness: There is no abdominal tenderness  Musculoskeletal:      Right lower leg: No edema  Left lower leg: No edema  Lymphadenopathy:      Cervical: No cervical adenopathy  Skin:     General: Skin is warm and dry  Coloration: Skin is not pale  Neurological:      General: No focal deficit present  Mental Status: He is alert  Gait: Gait abnormal       Comments: Gait ataxic   Using cane                [employer]   "

## 2023-08-31 ENCOUNTER — HOSPITAL ENCOUNTER (OUTPATIENT)
Dept: NON INVASIVE DIAGNOSTICS | Facility: MEDICAL CENTER | Age: 73
Discharge: HOME | End: 2023-08-31
Payer: MEDICARE

## 2023-08-31 ENCOUNTER — APPOINTMENT (OUTPATIENT)
Dept: LAB | Facility: MEDICAL CENTER | Age: 73
End: 2023-08-31
Payer: MEDICARE

## 2023-08-31 VITALS
HEART RATE: 68 BPM | DIASTOLIC BLOOD PRESSURE: 80 MMHG | HEIGHT: 69 IN | WEIGHT: 242 LBS | BODY MASS INDEX: 35.84 KG/M2 | SYSTOLIC BLOOD PRESSURE: 140 MMHG

## 2023-08-31 DIAGNOSIS — E78.1 HYPERTRIGLYCERIDEMIA: ICD-10-CM

## 2023-08-31 DIAGNOSIS — I10 HYPERTENSION, UNSPECIFIED TYPE: ICD-10-CM

## 2023-08-31 DIAGNOSIS — Z95.2 STATUS POST COMBINED AORTIC ROOT AND VALVE REPLACEMENT USING STENTLESS BIOPROSTHETIC AORTIC VALVE: ICD-10-CM

## 2023-08-31 DIAGNOSIS — I44.7 LBBB (LEFT BUNDLE BRANCH BLOCK): ICD-10-CM

## 2023-08-31 DIAGNOSIS — I35.9 AORTIC VALVE DISORDER: ICD-10-CM

## 2023-08-31 DIAGNOSIS — Z01.810 PREOP CARDIOVASCULAR EXAM: ICD-10-CM

## 2023-08-31 DIAGNOSIS — Z95.4 STATUS POST COMBINED AORTIC ROOT AND VALVE REPLACEMENT USING STENTLESS BIOPROSTHETIC AORTIC VALVE: ICD-10-CM

## 2023-08-31 LAB
ALBUMIN SERPL BCP-MCNC: 4.3 G/DL (ref 3.5–5)
ALP SERPL-CCNC: 57 U/L (ref 34–104)
ALT SERPL W P-5'-P-CCNC: 28 U/L (ref 7–52)
ANION GAP SERPL CALCULATED.3IONS-SCNC: 10 MMOL/L
AORTIC ROOT: 3.4 CM
AORTIC VALVE MEAN VELOCITY: 13.6 M/S
APICAL FOUR CHAMBER EJECTION FRACTION: 65 %
ASCENDING AORTA: 3.6 CM
AST SERPL W P-5'-P-CCNC: 24 U/L (ref 13–39)
AV AREA BY CONTINUOUS VTI: 2.3 CM2
AV AREA PEAK VELOCITY: 2 CM2
AV LVOT MEAN GRADIENT: 1 MMHG
AV LVOT PEAK GRADIENT: 3 MMHG
AV MEAN GRADIENT: 9 MMHG
AV PEAK GRADIENT: 15 MMHG
AV VALVE AREA: 2.27 CM2
AV VELOCITY RATIO: 0.42
BASOPHILS # BLD AUTO: 0.07 THOUSANDS/ÂΜL (ref 0–0.1)
BASOPHILS NFR BLD AUTO: 1 % (ref 0–1)
BILIRUB SERPL-MCNC: 0.68 MG/DL (ref 0.2–1)
BNP SERPL-MCNC: 69 PG/ML (ref 0–100)
BUN SERPL-MCNC: 25 MG/DL (ref 5–25)
CALCIUM SERPL-MCNC: 9.5 MG/DL (ref 8.4–10.2)
CHLORIDE SERPL-SCNC: 105 MMOL/L (ref 96–108)
CK SERPL-CCNC: 187 U/L (ref 39–308)
CO2 SERPL-SCNC: 27 MMOL/L (ref 21–32)
CREAT SERPL-MCNC: 1.08 MG/DL (ref 0.6–1.3)
DOP CALC AO PEAK VEL: 1.95 M/S
DOP CALC AO VTI: 47.34 CM
DOP CALC LVOT AREA: 4.91 CM2
DOP CALC LVOT CARDIAC INDEX: 2.69 L/MIN/M2
DOP CALC LVOT CARDIAC OUTPUT: 6.01 L/MIN
DOP CALC LVOT DIAMETER: 2.5 CM
DOP CALC LVOT PEAK VEL VTI: 21.87 CM
DOP CALC LVOT PEAK VEL: 0.81 M/S
DOP CALC LVOT STROKE INDEX: 49.1 ML/M2
DOP CALC LVOT STROKE VOLUME: 107.3 CM3
E WAVE DECELERATION TIME: 341 MS
EOSINOPHIL # BLD AUTO: 0.25 THOUSAND/ÂΜL (ref 0–0.61)
EOSINOPHIL NFR BLD AUTO: 3 % (ref 0–6)
ERYTHROCYTE [DISTWIDTH] IN BLOOD BY AUTOMATED COUNT: 13.2 % (ref 11.6–15.1)
FRACTIONAL SHORTENING: 26 % (ref 28–44)
GFR SERPL CREATININE-BSD FRML MDRD: 68 ML/MIN/1.73SQ M
GLUCOSE P FAST SERPL-MCNC: 109 MG/DL (ref 65–99)
HCT VFR BLD AUTO: 46.5 % (ref 36.5–49.3)
HGB BLD-MCNC: 14.8 G/DL (ref 12–17)
IMM GRANULOCYTES # BLD AUTO: 0.03 THOUSAND/UL (ref 0–0.2)
IMM GRANULOCYTES NFR BLD AUTO: 0 % (ref 0–2)
INTERVENTRICULAR SEPTUM IN DIASTOLE (PARASTERNAL SHORT AXIS VIEW): 1.5 CM
INTERVENTRICULAR SEPTUM: 1.5 CM (ref 0.6–1.1)
LAAS-AP2: 21.1 CM2
LAAS-AP4: 30.1 CM2
LEFT ATRIUM SIZE: 5 CM
LEFT ATRIUM VOLUME (MOD BIPLANE): 84 ML
LEFT INTERNAL DIMENSION IN SYSTOLE: 3.4 CM (ref 2.1–4)
LEFT VENTRICULAR INTERNAL DIMENSION IN DIASTOLE: 4.6 CM (ref 3.5–6)
LEFT VENTRICULAR POSTERIOR WALL IN END DIASTOLE: 1.2 CM
LEFT VENTRICULAR STROKE VOLUME: 49 ML
LVSV (TEICH): 49 ML
LYMPHOCYTES # BLD AUTO: 2.43 THOUSANDS/ÂΜL (ref 0.6–4.47)
LYMPHOCYTES NFR BLD AUTO: 29 % (ref 14–44)
MCH RBC QN AUTO: 30.1 PG (ref 26.8–34.3)
MCHC RBC AUTO-ENTMCNC: 31.8 G/DL (ref 31.4–37.4)
MCV RBC AUTO: 95 FL (ref 82–98)
MONOCYTES # BLD AUTO: 0.81 THOUSAND/ÂΜL (ref 0.17–1.22)
MONOCYTES NFR BLD AUTO: 10 % (ref 4–12)
MV E'TISSUE VEL-SEP: 6 CM/S
MV PEAK A VEL: 0.92 M/S
MV PEAK E VEL: 82 CM/S
MV STENOSIS PRESSURE HALF TIME: 99 MS
MV VALVE AREA P 1/2 METHOD: 2.22 CM2
NEUTROPHILS # BLD AUTO: 4.94 THOUSANDS/ÂΜL (ref 1.85–7.62)
NEUTS SEG NFR BLD AUTO: 57 % (ref 43–75)
NRBC BLD AUTO-RTO: 0 /100 WBCS
PLATELET # BLD AUTO: 232 THOUSANDS/UL (ref 149–390)
PMV BLD AUTO: 10.7 FL (ref 8.9–12.7)
POTASSIUM SERPL-SCNC: 4.9 MMOL/L (ref 3.5–5.3)
PROT SERPL-MCNC: 6.6 G/DL (ref 6.4–8.4)
RBC # BLD AUTO: 4.91 MILLION/UL (ref 3.88–5.62)
RIGHT ATRIUM AREA SYSTOLE A4C: 21.8 CM2
RIGHT VENTRICLE ID DIMENSION: 4.1 CM
SL CV LEFT ATRIUM LENGTH A2C: 6.1 CM
SL CV LV EF: 55
SL CV PED ECHO LEFT VENTRICLE DIASTOLIC VOLUME (MOD BIPLANE) 2D: 97 ML
SL CV PED ECHO LEFT VENTRICLE SYSTOLIC VOLUME (MOD BIPLANE) 2D: 49 ML
SODIUM SERPL-SCNC: 142 MMOL/L (ref 135–147)
TR MAX PG: 27 MMHG
TR PEAK VELOCITY: 2.6 M/S
TRICUSPID ANNULAR PLANE SYSTOLIC EXCURSION: 2.2 CM
TRICUSPID VALVE PEAK REGURGITATION VELOCITY: 2.61 M/S
WBC # BLD AUTO: 8.53 THOUSAND/UL (ref 4.31–10.16)

## 2023-08-31 PROCEDURE — 80053 COMPREHEN METABOLIC PANEL: CPT

## 2023-08-31 PROCEDURE — 93306 TTE W/DOPPLER COMPLETE: CPT

## 2023-08-31 PROCEDURE — 83880 ASSAY OF NATRIURETIC PEPTIDE: CPT

## 2023-08-31 PROCEDURE — 93306 TTE W/DOPPLER COMPLETE: CPT | Performed by: INTERNAL MEDICINE

## 2023-08-31 PROCEDURE — 85025 COMPLETE CBC W/AUTO DIFF WBC: CPT

## 2023-08-31 PROCEDURE — 36415 COLL VENOUS BLD VENIPUNCTURE: CPT

## 2023-08-31 PROCEDURE — 82550 ASSAY OF CK (CPK): CPT

## 2023-09-04 DIAGNOSIS — I10 HYPERTENSION, UNSPECIFIED TYPE: ICD-10-CM

## 2023-09-05 RX ORDER — METOPROLOL SUCCINATE 100 MG/1
100 TABLET, EXTENDED RELEASE ORAL DAILY
Qty: 90 TABLET | Refills: 1 | Status: SHIPPED | OUTPATIENT
Start: 2023-09-05

## 2023-10-04 ENCOUNTER — OFFICE VISIT (OUTPATIENT)
Dept: CARDIOLOGY CLINIC | Facility: MEDICAL CENTER | Age: 73
End: 2023-10-04
Payer: MEDICARE

## 2023-10-04 VITALS
DIASTOLIC BLOOD PRESSURE: 76 MMHG | OXYGEN SATURATION: 97 % | HEART RATE: 75 BPM | BODY MASS INDEX: 34.96 KG/M2 | SYSTOLIC BLOOD PRESSURE: 140 MMHG | WEIGHT: 236 LBS | HEIGHT: 69 IN

## 2023-10-04 DIAGNOSIS — I35.9 AORTIC VALVE DISORDER: ICD-10-CM

## 2023-10-04 DIAGNOSIS — E78.1 HYPERTRIGLYCERIDEMIA: Primary | ICD-10-CM

## 2023-10-04 DIAGNOSIS — I48.91 ATRIAL FIBRILLATION, UNSPECIFIED TYPE (HCC): ICD-10-CM

## 2023-10-04 DIAGNOSIS — I35.0 AORTIC VALVE STENOSIS, ETIOLOGY OF CARDIAC VALVE DISEASE UNSPECIFIED: ICD-10-CM

## 2023-10-04 DIAGNOSIS — E66.01 OBESITY, MORBID (HCC): ICD-10-CM

## 2023-10-04 DIAGNOSIS — I44.7 LEFT BUNDLE BRANCH BLOCK (LBBB): ICD-10-CM

## 2023-10-04 DIAGNOSIS — I71.20 THORACIC AORTIC ANEURYSM WITHOUT RUPTURE, UNSPECIFIED PART (HCC): ICD-10-CM

## 2023-10-04 PROCEDURE — 99214 OFFICE O/P EST MOD 30 MIN: CPT | Performed by: INTERNAL MEDICINE

## 2023-10-04 NOTE — PROGRESS NOTES
Cardiology Follow Up    Jessica Raman Ibapah  1950  [id number]  [facility] [id number] [phone]    1. Left bundle branch block (LBBB)        2. Atrial fibrillation, unspecified type ([address])        3. Aortic valve disorder        4. Aortic valve stenosis, etiology of cardiac valve disease unspecified        5. Hypertriglyceridemia        6. Thoracic aortic aneurysm without rupture, unspecified part ([address])        7. Obesity, morbid ([address])            Discussion: In view of the attribution of his ascending aneurysm to a genetic etiology, it would be reasonable to obtain a CT scan of the chest prior to his next visit. Bioprosthetic valve function was normal by echo 2 months ago, and it is not necessary to repeat this yearly. He has no symptoms. Next echo will be in 2025, with further spacing following that study. Blood pressure was borderline. I asked him to return for a follow-up visit with the CTA prior to that visit. I also ordered a lipid panel. Cardiovascular History:   Mr. Lo Castro was followed by Dr. Kevin Wooten for many years. He underwent implantation of a mechanical aortic valve in 2000. He subsequently developed an ascending aortic aneurysm that was attributed to a genetic disorder. He underwent repeat aortic valve replacement in 2013 at [facility] with implantation of a bovine bioprosthetic valve; aortic root replacement was also performed at that time. Coronary angiography prior to surgery in 2013 was normal.  He had gael-operative atrial fibrillation that resolved. Subsequent echocardiographic studies have been unremarkable. In 8/23 ejection fraction was 55% with mild diastolic dysfunction, mild left atrial enlargement, and mild right atrial enlargement. He has chronic left bundle branch block.   Lipids in 8/23 disclosed total cholesterol 179, , HDL 42, and triglycerides 156. He has multiple sclerosis which has been slowly progressive. He walks with the assistance of a cane. He is followed by the neurology service at [facility].  He is Maurice Aponte brother.      Patient Active Problem List   Diagnosis   • Multiple sclerosis ([address])   • Left bundle branch block (LBBB)   • Atrial fibrillation (HCC)   • Aortic valve disorder   • Aortic stenosis   • Prediabetes   • Functional urinary incontinence   • Diverticulosis   • Ambulatory dysfunction   • Hypertriglyceridemia   • Thoracic aortic aneurysm without rupture (HCC)   • Obesity, morbid (HCC)   • Vitamin D deficiency   • Memory loss   • Heart valve replaced   • Generalized weakness   • Diverticulitis   • Depression   • Abnormal gait   • Nail disorder   • Cataract of left eye     Past Medical History:   Diagnosis Date   • Ambulatory dysfunction 1/25/2021   • Aortic stenosis    • Aortic valve disorder    • Atrial fibrillation (HCC)    • 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 Kayla Medel   • Diverticulosis 1/25/2021   • Functional urinary incontinence 1/25/2021   • Heart murmur Congenetal    Surgectly repaired   • Hypertension 2000    Take Metoprolol   • Hypertriglyceridemia 1/25/2021   • Left bundle branch block (LBBB)    • Multiple sclerosis ([address])    • Multiple sclerosis ([address]) 1/25/2021   • Nail disorder 6/23/2021   • Need for hepatitis A and B vaccination 6/23/2021   • Obesity 2015   • Prediabetes 1/25/2021   • Urinary tract infection 2017    See Dr. Vitor Mars History     Socioeconomic History   • Marital status: /Civil Union     Spouse name: Not on file   • Number of children: Not on file   • Years of education: Not on file   • Highest education level: Not on file   Occupational History   • Not on file   Tobacco Use   • Smoking status: Former     Packs/day: 1.00     Years: 25.00     Total pack years: 25.00     Types: Cigarettes     Start date: 10/22/1968     Quit date: 1/10/2000     Years since quittin.7   • Smokeless tobacco: Never   • Tobacco comments:     Former Smoker per [employer]   • Vaping Use: Former   Substance and Sexual Activity   • Alcohol use: Yes     Alcohol/week: 5.0 standard drinks of alcohol     Types: 2 Glasses of wine, 2 Cans of beer, 1 Standard drinks or equivalent per week   • Drug use: Never   • Sexual activity: Not Currently     Partners: Female     Birth control/protection: None   Other Topics Concern   • Not on file   Social History Narrative    · Most recent tobacco use screenin2019      · Do you currently or have you served in the [address] [place] [address] [place] [employer]:   No      · Advance directive:   Yes     Per [employer]     Social Determinants of Health     Financial Resource Strain: Low Risk  (2022)    Overall Financial Resource Strain (CARDIA)    • Difficulty of Paying Living Expenses: Not hard at all   Food Insecurity: Not on file   Transportation Needs: No Transportation Needs (2022)    PRAPARE - Transportation    • Lack of Transportation (Medical): No    • Lack of Transportation (Non-Medical): No   Physical Activity: Not on file   Stress: Not on file   Social Connections: Not on file   Intimate Partner Violence: Not on file   Housing Stability: Not on file      Family History   Problem Relation Age of Onset   • Skin cancer Family    • Heart Valve Disease Family    • Valvular heart disease Mother         mitral valve disorder   • Melanoma Father    • Cancer Father         Melanoma   • Other Sister         cerebral meningioma     Past Surgical History:   Procedure Laterality Date   • AORTIC VALVE REPAIR  2013    ascending and transverse aorta repair per [facility]   • AORTIC VALVE REPLACEMENT      mechanical repair per [employer]   • CARDIAC SURGERY  [address] [facility] [address] .  [facility]   • CARDIAC VALVE REPLACEMENT  [id number]    Valve and Aortic root   • CERVICAL SPINE SURGERY  02/2013    C4-C5 anterior cervical discectomy and fusion per [facility]   • COLONOSCOPY  03/15/2018    3 years   • COLONOSCOPY  06/10/2014    5 years    • LYMPH NODE BIOPSY  2016   • RETINAL LASER PROCEDURE  02/2018    tear repair per [place]   • SPINE SURGERY  2012    C4=5 Fusion LVHN   • US GUIDED THYROID BIOPSY  9/10/2015   • US GUIDED THYROID BIOPSY  11/18/2015   • US GUIDED THYROID BIOPSY  3/23/2016       Current Outpatient Medications:   •  aspirin 81 MG tablet, Take 1 tablet by mouth daily Every other day, Disp: , Rfl:   •  B Complex Vitamins (VITAMIN B COMPLEX) TABS, Take by mouth, Disp: , Rfl:   •  cetirizine (ZyrTEC) 10 mg tablet, Take 10 mg by mouth daily, Disp: , Rfl:   •  finasteride (PROSCAR) 5 mg tablet, Take 5 mg by mouth daily, Disp: , Rfl:   •  methenamine hippurate (HIPREX) 1 g tablet, Take 1 g by mouth TAKE 1 in AM and 1/2 TAB PM, Disp: , Rfl:   •  metoprolol succinate (TOPROL-XL) 100 mg 24 hr tablet, Take 1 tablet (100 mg total) by mouth daily. , Disp: 90 tablet, Rfl: 1  •  multivitamin (THERAGRAN) TABS, Take 1 tablet by mouth daily, Disp: , Rfl:   •  Omega-3 Fatty Acids (FISH OIL) 1200 MG CAPS, Take by mouth, Disp: , Rfl:   •  tamsulosin (FLOMAX) 0.4 mg, Take 0.4 mg by mouth daily with dinner, Disp: , Rfl:   Allergies   Allergen Reactions   • Simvastatin-High Dose    • Statins Other (See Comments)     CPK levels elevated       Labs:  Hospital Outpatient Visit on 08/31/2023   Component Date Value   • AV area peak joey 08/31/2023 2.0    • LA size 08/31/2023 5    • Aortic valve mean veloci* 08/31/2023 13.60    • Triscuspid Valve Regurgi* 08/31/2023 27.0    • Tricuspid valve peak reg* 08/31/2023 2.61    • LVPWd 08/31/2023 1.20    • Left Atrium Area-systoli* 08/31/2023 21.1    • Left Atrium Area-systoli* 08/31/2023 30.1    • MV E' Tissue Velocity Se* 08/31/2023 6    • Tricuspid annular plane * 08/31/2023 2.20    • TR Peak Joey 08/31/2023 2.6    • IVSd 08/31/2023 5.46    • LV DIASTOLIC VOLUME (MOD* 08/31/2023 97    • LEFT VENTRICLE SYSTOLIC * 22/43/0208 49    • Left ventricular stroke * 08/31/2023 49.00    • A4C EF 08/31/2023 65    • LA length (A2C) 08/31/2023 6.10    • LVIDd 08/31/2023 4.60    • IVS 08/31/2023 1.5    • LVIDS 08/31/2023 3.40    • FS 08/31/2023 26    • LA volume (BP) 08/31/2023 84    • Asc Ao 08/31/2023 3.6    • Ao root 08/31/2023 3.40    • RVID d 08/31/2023 4.1    • LVOT mn grad 08/31/2023 1.0    • AV area by cont VTI 08/31/2023 2.3    • AV mean gradient 08/31/2023 9    • AV LVOT peak gradient 08/31/2023 3    • MV valve area p 1/2 meth* 08/31/2023 2.22    • E wave deceleration time 08/31/2023 341    • LVOT diameter 08/31/2023 2.5    • LVOT peak quinton 08/31/2023 0.81    • LVOT peak VTI 08/31/2023 21.87    • Aortic valve peak veloci* 08/31/2023 1.95    • Ao VTI 08/31/2023 47.34    • LVOT stroke volume 08/31/2023 107.30    • AV peak gradient 08/31/2023 15    • MV Peak E Quinton 08/31/2023 82    • MV Peak A Quinton 08/31/2023 0.92    • RAA A4C 08/31/2023 21.8    • MV stenosis pressure 1/2* 08/31/2023 99    • LVOT Cardiac Output 08/31/2023 6.01    • LVOT stroke volume index 08/31/2023 49.10    • LVOT Cardiac Index 08/31/2023 2.69    • LVSV, 2D 08/31/2023 49    • LVOT area 08/31/2023 4.91    • DVI 08/31/2023 0.42    • AV valve area 08/31/2023 2.27    • LV EF 08/31/2023 55    Appointment on 08/31/2023   Component Date Value   • WBC 08/31/2023 8.53    • RBC 08/31/2023 4.91    • Hemoglobin 08/31/2023 14.8    • Hematocrit 08/31/2023 46.5    • MCV 08/31/2023 95    • MCH 08/31/2023 30.1    • MCHC 08/31/2023 31.8    • RDW 08/31/2023 13.2    • MPV 08/31/2023 10.7    • Platelets 40/84/8154 232    • nRBC 08/31/2023 0    • Neutrophils Relative 08/31/2023 57    • Immat GRANS % 08/31/2023 0    • Lymphocytes Relative 08/31/2023 29    • Monocytes Relative 08/31/2023 10    • Eosinophils Relative 08/31/2023 3    • Basophils Relative 08/31/2023 1    • Neutrophils Absolute 08/31/2023 4.94    • Immature Grans Absolute 08/31/2023 0.03    • Lymphocytes Absolute 08/31/2023 2.43    • Monocytes Absolute 08/31/2023 0.81    • Eosinophils Absolute 08/31/2023 0.25    • Basophils Absolute 08/31/2023 0.07    • Total CK 08/31/2023 187    • Sodium 08/31/2023 142    • Potassium 08/31/2023 4.9    • Chloride 08/31/2023 105    • CO2 08/31/2023 27    • ANION GAP 08/31/2023 10    • BUN 08/31/2023 25    • Creatinine 08/31/2023 1.08    • Glucose, Fasting 08/31/2023 109 (H)    • Calcium 08/31/2023 9.5    • AST 08/31/2023 24    • ALT 08/31/2023 28    • Alkaline Phosphatase 08/31/2023 57    • Total Protein 08/31/2023 6.6    • Albumin 08/31/2023 4.3    • Total Bilirubin 08/31/2023 0.68    • eGFR 08/31/2023 68    • BNP 08/31/2023 69    Transcribe Orders on 08/31/2023   Component Date Value   • Cholesterol 08/31/2023 179    • Triglycerides 08/31/2023 156 (H)    • HDL, Direct 08/31/2023 42    • LDL Calculated 08/31/2023 106 (H)    • Non-HDL-Chol (CHOL-HDL) 08/31/2023 137      Imaging: No results found. Review of Systems:  Review of Systems   Constitutional: Negative. HENT: Negative. Eyes: Negative. Cardiovascular: Negative. Respiratory: Negative. Endocrine: Negative. Hematologic/Lymphatic: Negative. Skin: Negative. Musculoskeletal: Negative. Gastrointestinal: Negative. Genitourinary: Negative. Neurological: Negative. Psychiatric/Behavioral: Negative. Allergic/Immunologic: Negative. All other systems reviewed and are negative. Vitals:    10/04/23 1008   BP: 140/76   Pulse: 75   SpO2: 97%     Weight (last 2 days)     Date/Time Weight    10/04/23 1008 107 (236)          Physical Exam:  Physical Exam  Vitals reviewed. Constitutional:       General: He is not in acute distress. Appearance: He is well-developed. He is not diaphoretic. HENT:      Head: Normocephalic and atraumatic. Eyes:      General: No scleral icterus. Conjunctiva/sclera: Conjunctivae normal.   Neck:      Vascular: No JVD. Trachea: No tracheal deviation. Cardiovascular:      Rate and Rhythm: Normal rate and regular rhythm. Pulses: Intact distal pulses. Heart sounds: Murmur heard. No friction rub. No gallop. Comments: Gr 2/6 PHYLICIA base  Pulmonary:      Effort: Pulmonary effort is normal. No respiratory distress. Breath sounds: Normal breath sounds. No stridor. No wheezing or rales. Chest:      Chest wall: No tenderness. Abdominal:      General: Bowel sounds are normal. There is no distension. Palpations: Abdomen is soft. Tenderness: There is no abdominal tenderness. Musculoskeletal:         General: No tenderness. Normal range of motion. Cervical back: Normal range of motion and neck supple. Skin:     General: Skin is warm and dry. Findings: No erythema. Neurological:      Mental Status: He is alert and oriented to person, place, and time. Cranial Nerves: No cranial nerve deficit. Coordination: Coordination normal.      Gait: Gait abnormal.      Comments: MS; gait dysfunction; walks with cane   Psychiatric:         Behavior: Behavior normal.         Thought Content:  Thought content normal.         Judgment: Judgment normal.         Julia Gay MD

## 2023-11-14 ENCOUNTER — TELEPHONE (OUTPATIENT)
Age: 73
End: 2023-11-14

## 2023-11-14 DIAGNOSIS — Z86.010 PERSONAL HISTORY OF COLONIC POLYPS: Primary | ICD-10-CM

## 2023-11-14 NOTE — TELEPHONE ENCOUNTER
Patients GI provider:  Dr. Jess Mai    Number to return call: [phone]    Reason for call: Pt calling in with questions regarding his up coming procedure at  [facility]. Left a message for Sahil Vivar to call patient to further discuss.     Scheduled procedure/appointment date if applicable: Apt/procedure 11/16/2023

## 2023-11-14 NOTE — LETTER
[address] [id number]        FLEXIBLE COLONOSCOPY INSTRUCTIONS  PLEASE NOTE. .. AS OF JUNE 1, 2014, OUR OFFICE REQUIRES 72 HOURS NOTICE OF CANCELLATION/RESCHEDULE OF A PROCEDURE TO AVOID INCURRING A MISSED APPOINTMENT FEE. Your Colonoscopy Procedure has been scheduled at:  [address] [place] [phone]    The Date of your Procedure is: November 16, 2023      Dr. Sapna Coy  will be performing the procedure. Report to the [facility] 45 minutes prior to the procedure. The total time at the facility will be approximately 1 1/2 hours. Please bring to your procedure at [facility]:   1. Insurance Cards and referrals if required by [employer] company   2. Valid Photo ID   3. Power of  Form if required    Use the bowel preparation as directed. Check with your family doctor if you are taking a blood thinner (Coumadin, Plavix, Xarelto, Pradaxa, Gingko biloba, Ginseng, Feverfew, Hewitt's Wort). We suggest stopping these for 3 days. Special instructions may be needed if you are taking aspirin or any aspirin-containing medication. Check with your family physician. If you are on DIABETIC MEDICATION (tablets or insulin) your doctor may make changes in your preparation. Take all medications usual unless otherwise instructed. Feel free to call the physician's office to answer any questions or address any concerns you have regarding your procedure or preparation. A nurse will be happy to assist you. Because anesthesia is given to you during the procedure, the center requires that you be discharged under supervision of an adult to take you home after the procedure is performed. They will also need to sign as a witness on your discharge instructions. Public Transportation such as VAST, BUS, TAXI is Not Acceptable.     It is important you notify our office of any insurance changes prior to your procedure and, if necessary, supply us with referrals from your primary care physician. COLONOSCOPY PREPARATION INSTRUCTIONS    Purchase (prescription not required):  · 238 gram bottle of Miralax® (Glycolax®)  · 4 Dulcolax® (Bisacodyl) Laxative Tablets  · 64 oz. bottle of Gatorade® or your preference of a non-carbonated clear liquid - NOT RED OR PURPLE     One Day Prior to Colonoscopy Procedure  · Nothing to eat the day before your procedure, only clear liquids. · It is important that you drink plenty of clear liquids throughout the day to prevent dehydration. Clear Liquids include:  o Water/Iced Tea/Lemonade/Gatorade®/Black Coffee or tea (no milk or creamers  o Soft drinks: orange, ginger ale, cola, Pepsi®, Sprite®, 7Up®  o Donell-Aid® (lemonade or orange flavors only)  o Strained fruit juices without pulp such as apple, white grape, white cranberry  o Jell-O®, lemon, lime or orange (no fruit or toppings)  o Popsicles, Matt Ice (No Ice Cream, sherbets, or fruit bars)  o Chicken or beef bouillon/broth  DO NOT EAT OR DRINK ANYTHING RED OR PURPLE  DO NOT DRINK ANY ALCOHOLIC BEVERAGES  DIABETIC PATIENTS: Consult your physician    At 4:00 pm, take (2) Dulcolax® (Bisacodyl) Laxative Tablets. Swallow the tablets whole with an 8 oz. glass of water. At 8:00 pm, take the additional (2) Dulcolax® (Bisacodyl) Laxative Tablets with 8 oz. of water. The package may direct you not to exceed (2) tablets at any time but for the purpose of this examination, you should take (4) total.    Mix the 238 gm of Miralax® in 64 oz. of Gatorade® and shake the solution until the Miralax® is dissolved. You will drink half (32 oz) of this solution this evening, beginning between 4 and 6 o'clock. Drink 8 oz glassfuls at your own pace. It may take several hours to drink the solution. Remember to stay close to toilet facilities.     DAY OF COLONOSCOPY PROCEDURE    Five (5) hours before your procedure, drink the other half (32 oz) of the Miralax®/Gatorade® mixture within a two (2) hour period. This may require you to get up very early if you are scheduled for an early procedure. NOTHING IS TO BE TAKEN BY MOUTH 3 HOURS PRIOR TO PROCEDURE. If you use an inhaler, please bring it with you to your procedure.

## 2023-11-15 NOTE — TELEPHONE ENCOUNTER
Patient scheduled via [address] [place] [address]     TR 3 yr recall, last fc 3//15/18 tub ad x2, diverticulosis, BMI 34     Scheduled TR 11[facility]16 [facility]  [facility]

## 2023-12-26 DIAGNOSIS — I10 HYPERTENSION, UNSPECIFIED TYPE: ICD-10-CM

## 2023-12-27 NOTE — TELEPHONE ENCOUNTER
Requested medication(s) are due for refill today: Yes  Patient has already received a courtesy refill: Yes  Other reason request has been forwarded to provider:

## 2024-01-02 RX ORDER — METOPROLOL SUCCINATE 100 MG/1
100 TABLET, EXTENDED RELEASE ORAL DAILY
Qty: 90 TABLET | Refills: 0 | Status: SHIPPED | OUTPATIENT
Start: 2024-01-02

## 2024-04-19 ENCOUNTER — TELEPHONE (OUTPATIENT)
Age: 74
End: 2024-04-19

## 2024-04-19 NOTE — TELEPHONE ENCOUNTER
Pt was seen in [facility] for admission. Where would you like me to put him for hospital f/u? Thanks!

## 2024-04-19 NOTE — TELEPHONE ENCOUNTER
Pt called to schedule TCM appt.    Warm Transfer Unavailable (WTU)     Please return patient's call at your convenience to schedule appt @ [phone]

## 2024-04-19 NOTE — TELEPHONE ENCOUNTER
Yes please. I have not seen him since 6/2023. Please schedule him in a 40 min slot since it looks like it will be a complicated visit. thanks

## 2024-04-29 ENCOUNTER — RA CDI HCC (OUTPATIENT)
Dept: OTHER | Facility: HOSPITAL | Age: 74
End: 2024-04-29

## 2024-05-01 RX ORDER — IBUPROFEN 200 MG
200 TABLET ORAL EVERY 6 HOURS PRN
COMMUNITY
Start: 2024-04-15

## 2024-05-06 ENCOUNTER — OFFICE VISIT (OUTPATIENT)
Dept: FAMILY MEDICINE CLINIC | Facility: CLINIC | Age: 74
End: 2024-05-06
Payer: MEDICARE

## 2024-05-06 VITALS
BODY MASS INDEX: 35.28 KG/M2 | WEIGHT: 238.2 LBS | HEIGHT: 69 IN | OXYGEN SATURATION: 97 % | HEART RATE: 85 BPM | SYSTOLIC BLOOD PRESSURE: 136 MMHG | RESPIRATION RATE: 16 BRPM | DIASTOLIC BLOOD PRESSURE: 88 MMHG | TEMPERATURE: 97.9 F

## 2024-05-06 DIAGNOSIS — I10 ESSENTIAL HYPERTENSION: ICD-10-CM

## 2024-05-06 DIAGNOSIS — G35 MULTIPLE SCLEROSIS (HCC): ICD-10-CM

## 2024-05-06 DIAGNOSIS — E66.01 OBESITY, MORBID (HCC): ICD-10-CM

## 2024-05-06 DIAGNOSIS — Z00.00 MEDICARE ANNUAL WELLNESS VISIT, SUBSEQUENT: Primary | ICD-10-CM

## 2024-05-06 DIAGNOSIS — I48.91 ATRIAL FIBRILLATION, UNSPECIFIED TYPE (HCC): ICD-10-CM

## 2024-05-06 DIAGNOSIS — I71.20 THORACIC AORTIC ANEURYSM WITHOUT RUPTURE, UNSPECIFIED PART (HCC): ICD-10-CM

## 2024-05-06 DIAGNOSIS — E78.1 HYPERTRIGLYCERIDEMIA: ICD-10-CM

## 2024-05-06 PROCEDURE — G0439 PPPS, SUBSEQ VISIT: HCPCS | Performed by: NURSE PRACTITIONER

## 2024-05-06 NOTE — PATIENT INSTRUCTIONS
Medicare Preventive Visit Patient Instructions  Thank you for completing your Welcome to Medicare Visit or Medicare Annual Wellness Visit today. Your next wellness visit will be due in one year (5/7/2025).  The screening/preventive services that you may require over the next 5-10 years are detailed below. Some tests may not apply to you based off risk factors and/or age. Screening tests ordered at today's visit but not completed yet may show as past due. Also, please note that scanned in results may not display below.  Preventive Screenings:  Service Recommendations Previous Testing/Comments   Colorectal Cancer Screening  Colonoscopy    Fecal Occult Blood Test (FOBT)/Fecal Immunochemical Test (FIT)  Fecal DNA/Cologuard Test  Flexible Sigmoidoscopy Age: 45-75 years old   Colonoscopy: every 10 years (May be performed more frequently if at higher risk)  OR  FOBT/FIT: every 1 year  OR  Cologuard: every 3 years  OR  Sigmoidoscopy: every 5 years  Screening may be recommended earlier than age 45 if at higher risk for colorectal cancer. Also, an individualized decision between you and your healthcare provider will decide whether screening between the ages of 76-85 would be appropriate. Colonoscopy: 11/16/2023  FOBT/FIT: Not on file  Cologuard: Not on file  Sigmoidoscopy: Not on file          Prostate Cancer Screening Individualized decision between patient and health care provider in men between ages of 55-69   Medicare will cover every 12 months beginning on the day after your 50th birthday PSA: 1.4 ng/mL           Hepatitis C Screening Once for adults born between 1945 and 1965  More frequently in patients at high risk for Hepatitis C Hep C Antibody: 06/22/2021        Diabetes Screening 1-2 times per year if you're at risk for diabetes or have pre-diabetes Fasting glucose: 109 mg/dL (8/31/2023)  A1C: 5.5 % (10/19/2021)      Cholesterol Screening Once every 5 years if you don't have a lipid disorder. May order more often  based on risk factors. Lipid panel: 08/31/2023         Other Preventive Screenings Covered by Medicare:  Abdominal Aortic Aneurysm (AAA) Screening: covered once if your at risk. You're considered to be at risk if you have a family history of AAA or a male between the age of 65-75 who smoking at least 100 cigarettes in your lifetime.  Lung Cancer Screening: covers low dose CT scan once per year if you meet all of the following conditions: (1) Age 55-77; (2) No signs or symptoms of lung cancer; (3) Current smoker or have quit smoking within the last 15 years; (4) You have a tobacco smoking history of at least 20 pack years (packs per day x number of years you smoked); (5) You get a written order from a healthcare provider.  Glaucoma Screening: covered annually if you're considered high risk: (1) You have diabetes OR (2) Family history of glaucoma OR (3)  aged 50 and older OR (4)  American aged 65 and older  Osteoporosis Screening: covered every 2 years if you meet one of the following conditions: (1) Have a vertebral abnormality; (2) On glucocorticoid therapy for more than 3 months; (3) Have primary hyperparathyroidism; (4) On osteoporosis medications and need to assess response to drug therapy.  HIV Screening: covered annually if you're between the age of 15-65. Also covered annually if you are younger than 15 and older than 65 with risk factors for HIV infection. For pregnant patients, it is covered up to 3 times per pregnancy.    Immunizations:  Immunization Recommendations   Influenza Vaccine Annual influenza vaccination during flu season is recommended for all persons aged >= 6 months who do not have contraindications   Pneumococcal Vaccine   * Pneumococcal conjugate vaccine = PCV13 (Prevnar 13), PCV15 (Vaxneuvance), PCV20 (Prevnar 20)  * Pneumococcal polysaccharide vaccine = PPSV23 (Pneumovax) Adults 19-65 yo with certain risk factors or if 65+ yo  If never received any pneumonia vaccine:  recommend Prevnar 20 (PCV20)  Give PCV20 if previously received 1 dose of PCV13 or PPSV23   Hepatitis B Vaccine 3 dose series if at intermediate or high risk (ex: diabetes, end stage renal disease, liver disease)   Respiratory syncytial virus (RSV) Vaccine - COVERED BY MEDICARE PART D  * RSVPreF3 (Arexvy) CDC recommends that adults 60 years of age and older may receive a single dose of RSV vaccine using shared clinical decision-making (SCDM)   Tetanus (Td) Vaccine - COST NOT COVERED BY MEDICARE PART B Following completion of primary series, a booster dose should be given every 10 years to maintain immunity against tetanus. Td may also be given as tetanus wound prophylaxis.   Tdap Vaccine - COST NOT COVERED BY MEDICARE PART B Recommended at least once for all adults. For pregnant patients, recommended with each pregnancy.   Shingles Vaccine (Shingrix) - COST NOT COVERED BY MEDICARE PART B  2 shot series recommended in those 19 years and older who have or will have weakened immune systems or those 50 years and older     Health Maintenance Due:      Topic Date Due   • Colorectal Cancer Screening  11/16/2028   • Hepatitis C Screening  Completed     Immunizations Due:      Topic Date Due   • COVID-19 Vaccine (8 - 2023-24 season) 12/05/2023     Advance Directives   What are advance directives?  Advance directives are legal documents that state your wishes and plans for medical care. These plans are made ahead of time in case you lose your ability to make decisions for yourself. Advance directives can apply to any medical decision, such as the treatments you want, and if you want to donate organs.   What are the types of advance directives?  There are many types of advance directives, and each state has rules about how to use them. You may choose a combination of any of the following:  Living will:  This is a written record of the treatment you want. You can also choose which treatments you do not want, which to limit, and  which to stop at a certain time. This includes surgery, medicine, IV fluid, and tube feedings.   Durable power of  for healthcare (DPAHC):  This is a written record that states who you want to make healthcare choices for you when you are unable to make them for yourself. This person, called a proxy, is usually a family member or a friend. You may choose more than 1 proxy.  Do not resuscitate (DNR) order:  A DNR order is used in case your heart stops beating or you stop breathing. It is a request not to have certain forms of treatment, such as CPR. A DNR order may be included in other types of advance directives.  Medical directive:  This covers the care that you want if you are in a coma, near death, or unable to make decisions for yourself. You can list the treatments you want for each condition. Treatment may include pain medicine, surgery, blood transfusions, dialysis, IV or tube feedings, and a ventilator (breathing machine).  Values history:  This document has questions about your views, beliefs, and how you feel and think about life. This information can help others choose the care that you would choose.  Why are advance directives important?  An advance directive helps you control your care. Although spoken wishes may be used, it is better to have your wishes written down. Spoken wishes can be misunderstood, or not followed. Treatments may be given even if you do not want them. An advance directive may make it easier for your family to make difficult choices about your care.   Weight Management   Why it is important to manage your weight:  Being overweight increases your risk of health conditions such as heart disease, high blood pressure, type 2 diabetes, and certain types of cancer. It can also increase your risk for osteoarthritis, sleep apnea, and other respiratory problems. Aim for a slow, steady weight loss. Even a small amount of weight loss can lower your risk of health problems.  How to lose  weight safely:  A safe and healthy way to lose weight is to eat fewer calories and get regular exercise. You can lose up about 1 pound a week by decreasing the number of calories you eat by 500 calories each day.   Healthy meal plan for weight management:  A healthy meal plan includes a variety of foods, contains fewer calories, and helps you stay healthy. A healthy meal plan includes the following:  Eat whole-grain foods more often.  A healthy meal plan should contain fiber. Fiber is the part of grains, fruits, and vegetables that is not broken down by your body. Whole-grain foods are healthy and provide extra fiber in your diet. Some examples of whole-grain foods are whole-wheat breads and pastas, oatmeal, brown rice, and bulgur.  Eat a variety of vegetables every day.  Include dark, leafy greens such as spinach, kale, bri greens, and mustard greens. Eat yellow and orange vegetables such as carrots, sweet potatoes, and winter squash.   Eat a variety of fruits every day.  Choose fresh or canned fruit (canned in its own juice or light syrup) instead of juice. Fruit juice has very little or no fiber.  Eat low-fat dairy foods.  Drink fat-free (skim) milk or 1% milk. Eat fat-free yogurt and low-fat cottage cheese. Try low-fat cheeses such as mozzarella and other reduced-fat cheeses.  Choose meat and other protein foods that are low in fat.  Choose beans or other legumes such as split peas or lentils. Choose fish, skinless poultry (chicken or turkey), or lean cuts of red meat (beef or pork). Before you cook meat or poultry, cut off any visible fat.   Use less fat and oil.  Try baking foods instead of frying them. Add less fat, such as margarine, sour cream, regular salad dressing and mayonnaise to foods. Eat fewer high-fat foods. Some examples of high-fat foods include french fries, doughnuts, ice cream, and cakes.  Eat fewer sweets.  Limit foods and drinks that are high in sugar. This includes candy, cookies,  regular soda, and sweetened drinks.  Exercise:  Exercise at least 30 minutes per day on most days of the week. Some examples of exercise include walking, biking, dancing, and swimming. You can also fit in more physical activity by taking the stairs instead of the elevator or parking farther away from stores. Ask your healthcare provider about the best exercise plan for you.      © Copyright [employer] 2018 Information is for End User's use only and may not be sold, redistributed or otherwise used for commercial purposes. All illustrations and images included in CareNotes® are the copyrighted property of [employer].A[employer] Inc[employer] or [employer]

## 2024-05-06 NOTE — PROGRESS NOTES
Assessment and Plan:     Problem List Items Addressed This Visit    None  Visit Diagnoses       Medicare annual wellness visit, subsequent    -  Primary            Depression Screening and Follow-up Plan: Patient was screened for depression during today's encounter. They screened negative with a PHQ-9 score of 2.    Falls Plan of Care: balance, strength, and gait training instructions were provided. Recommended assistive device to help with gait and balance. Medications that increase falls were reviewed. Assessed feet and footwear.       Preventive health issues were discussed with patient, and age appropriate screening tests were ordered as noted in patient's After Visit Summary.  Personalized health advice and appropriate referrals for health education or preventive services given if needed, as noted in patient's After Visit Summary.     History of Present Illness:     Patient presents for a Medicare Wellness Visit    HPI   Patient Care Team:  SCOTT Zaman as PCP - General (Family Medicine)  MD Junaid Beal MD     Review of Systems:     Review of Systems   Constitutional:  Negative for fever.   HENT:  Negative for trouble swallowing.    Respiratory:  Negative for cough and shortness of breath.    Cardiovascular: Negative.    Gastrointestinal:  Negative for blood in stool.   Genitourinary:  Negative for difficulty urinating, dysuria and hematuria.   Skin:  Negative for wound.   Neurological:  Positive for weakness. Negative for dizziness.        Problem List:     Patient Active Problem List   Diagnosis    Multiple sclerosis (HCC)    Left bundle branch block (LBBB)    Atrial fibrillation (HCC)    Aortic valve disorder    Aortic stenosis    Prediabetes    Functional urinary incontinence    Diverticulosis    Ambulatory dysfunction    Hypertriglyceridemia    Thoracic aortic aneurysm without rupture (HCC)    Obesity, morbid (HCC)    Vitamin D deficiency    Memory loss    Heart valve replaced     Generalized weakness    Diverticulitis    Depression    Abnormal gait    Nail disorder    Cataract of left eye      Past Medical and Surgical History:     Past Medical History:   Diagnosis Date    Ambulatory dysfunction 1/25/2021    Aortic stenosis     Aortic valve disorder     Atrial fibrillation (HCC)     Cancer (HCC) 2000    Skin-Basal cell    Coronary artery disease Aortic valve 2000    Disease of thyroid gland Nodules    See Dr. Guevara    Diverticulosis 1/25/2021    Functional urinary incontinence 1/25/2021    Heart murmur Congenetal    Surgectly repaired    Hypertension 2000    Take Metoprolol    Hypertriglyceridemia 1/25/2021    Left bundle branch block (LBBB)     Multiple sclerosis (HCC)     Multiple sclerosis (HCC) 1/25/2021    Nail disorder 6/23/2021    Need for hepatitis A and B vaccination 6/23/2021    Obesity 2015    Prediabetes 1/25/2021    Urinary tract infection 2017    See Dr. Shearer     Past Surgical History:   Procedure Laterality Date    AORTIC VALVE REPAIR  04/2013    ascending and transverse aorta repair per [place]    AORTIC VALVE REPLACEMENT  2001    mechanical repair per [place]    CARDIAC SURGERY  2000,2012    [facility]    CARDIAC VALVE REPLACEMENT  2000,2012    Valve and Aortic root    CERVICAL SPINE SURGERY  02/2013    C4-C5 anterior cervical discectomy and fusion per [facility]    COLONOSCOPY  03/15/2018    3 years    COLONOSCOPY  06/10/2014    5 years     LYMPH NODE BIOPSY  2016    RETINAL LASER PROCEDURE  02/2018    tear repair per [place]    SPINE SURGERY  2012    C4=5 Fusion [facility]    US GUIDED THYROID BIOPSY  9/10/2015    US GUIDED THYROID BIOPSY  11/18/2015    US GUIDED THYROID BIOPSY  3/23/2016      Family History:     Family History   Problem Relation Age of Onset    Skin cancer Family     Heart Valve Disease Family     Valvular heart disease Mother         mitral valve disorder    Melanoma Father     Cancer Father         Melanoma    Other Sister          cerebral meningioma      Social History:     Social History     Socioeconomic History    Marital status: /Civil Union     Spouse name: None    Number of children: None    Years of education: None    Highest education level: None   Occupational History    None   Tobacco Use    Smoking status: Former     Current packs/day: 0.00     Average packs/day: 1 pack/day for 31.2 years (31.2 ttl pk-yrs)     Types: Cigarettes     Start date: 10/22/1968     Quit date: 1/10/2000     Years since quittin.3    Smokeless tobacco: Never    Tobacco comments:     Former Smoker per [facility]   Vaping Use    Vaping status: Former   Substance and Sexual Activity    Alcohol use: Yes     Alcohol/week: 5.0 standard drinks of alcohol     Types: 2 Glasses of wine, 2 Cans of beer, 1 Standard drinks or equivalent per week    Drug use: Never    Sexual activity: Not Currently     Partners: Female     Birth control/protection: None   Other Topics Concern    None   Social History Narrative    · Most recent tobacco use screenin2019      · Do you currently or have you served in the [employer]:   No      · Advance directive:   Yes     Per [facility]     Social Determinants of Health     Financial Resource Strain: Low Risk  (4/10/2024)    Received from [facility]    Overall Financial Resource Strain (CARDIA)     Difficulty of Paying Living Expenses: Not hard at all   Food Insecurity: No Food Insecurity (4/10/2024)    Received from [facility]    Hunger Vital Sign     Worried About Running Out of Food in the Last Year: Never true     Ran Out of Food in the Last Year: Never true   Transportation Needs: No Transportation Needs (4/10/2024)    Received from [facility]    PRAPARE - Transportation     Lack of Transportation (Medical): No     Lack of Transportation (Non-Medical): No   Physical Activity: Not on file   Stress: Not on file   Social Connections: Not on file   Intimate Partner  Violence: Not At Risk (4/10/2024)    Received from [facility]    Humiliation, Afraid, Rape, and Kick questionnaire     Fear of Current or Ex-Partner: No     Emotionally Abused: No     Physically Abused: No     Sexually Abused: No   Housing Stability: Low Risk  (4/10/2024)    Received from [facility]    Housing Stability Vital Sign     Unable to Pay for Housing in the Last Year: No     Number of Places Lived in the Last Year: 1     Unstable Housing in the Last Year: No      Medications and Allergies:     Current Outpatient Medications   Medication Sig Dispense Refill    ibuprofen (MOTRIN) 200 mg tablet Take 200 mg by mouth every 6 (six) hours as needed for moderate pain      aspirin 81 MG tablet Take 1 tablet by mouth daily Every other day      B Complex Vitamins (VITAMIN B COMPLEX) TABS Take by mouth      cetirizine (ZyrTEC) 10 mg tablet Take 10 mg by mouth daily      finasteride (PROSCAR) 5 mg tablet Take 5 mg by mouth daily      methenamine hippurate (HIPREX) 1 g tablet Take 1 g by mouth TAKE 1 in AM and 1/2 TAB PM      metoprolol succinate (TOPROL-XL) 100 mg 24 hr tablet Take 1 tablet by mouth daily. 90 tablet 0    multivitamin (THERAGRAN) TABS Take 1 tablet by mouth daily      Omega-3 Fatty Acids (FISH OIL) 1200 MG CAPS Take by mouth      tamsulosin (FLOMAX) 0.4 mg Take 0.4 mg by mouth daily with dinner       No current facility-administered medications for this visit.     Allergies   Allergen Reactions    Simvastatin-High Dose     Statins Other (See Comments)     CPK levels elevated      Immunizations:     Immunization History   Administered Date(s) Administered    COVID-19 MODERNA VACC 0.5 ML IM 02/12/2021, 03/12/2021, 11/02/2021, 06/10/2022    COVID-19 Moderna Vac BIVALENT 12 Yr+ IM 0.5 ML 09/14/2022, 06/06/2023    COVID-19 Moderna mRNA Vaccine 12 Yr+ 50 mcg/0.5 mL (Spikevax) 10/10/2023    INFLUENZA 01/16/2014, 03/20/2014, 11/05/2015, 10/24/2022    Influenza Split High Dose  Preservative Free IM 11/01/2016, 10/10/2017    Influenza, high dose seasonal 0.7 mL 10/06/2020    Pneumococcal Conjugate Vaccine 20-valent (Pcv20), Polysace 11/07/2022    Pneumococcal Polysaccharide PPV23 01/01/2013    Tdap 02/08/2012    Zoster Vaccine Recombinant 03/06/2020, 06/23/2020      Health Maintenance:         Topic Date Due    Colorectal Cancer Screening  11/16/2028    Hepatitis C Screening  Completed         Topic Date Due    COVID-19 Vaccine (8 - 2023-24 season) 12/05/2023      Medicare Screening Tests and Risk Assessments:     Phong is here for his Subsequent Wellness visit. Last Medicare Wellness visit information reviewed, patient interviewed, no change since last AWV. Last Medicare Wellness visit information reviewed, patient interviewed and updates made to the record today.      Health Risk Assessment:   Patient rates overall health as fair. Patient feels that their physical health rating is same. Patient is satisfied with their life. Eyesight was rated as same. Hearing was rated as same. Patient feels that their emotional and mental health rating is same. Patients states they are sometimes angry. Patient states they are often unusually tired/fatigued. Pain experienced in the last 7 days has been none. Patient states that he has experienced no weight loss or gain in last 6 months.     Depression Screening:   PHQ-9 Score: 2      Fall Risk Screening:   In the past year, patient has experienced: no history of falling in past year      Home Safety:  Patient does not have trouble with stairs inside or outside of their home. Patient has working smoke alarms and has working carbon monoxide detector. Home safety hazards include: none.     Nutrition:   Current diet is Regular and Limited junk food.     Medications:   Patient is currently taking over-the-counter supplements. OTC medications include: see medication list. Patient is able to manage medications.     Activities of Daily Living (ADLs)/Instrumental  Activities of Daily Living (IADLs):   Walk and transfer into and out of bed and chair?: Yes  Dress and groom yourself?: Yes    Bathe or shower yourself?: Yes    Feed yourself? Yes  Do your laundry/housekeeping?: Yes  Manage your money, pay your bills and track your expenses?: Yes  Make your own meals?: Yes    Do your own shopping?: Yes    Previous Hospitalizations:   Any hospitalizations or ED visits within the last 12 months?: No      Advance Care Planning:   Living will: Yes    Durable POA for healthcare: Yes    Advanced directive: Yes    Advanced directive counseling given: Yes      Cognitive Screening:   Provider or family/friend/caregiver concerned regarding cognition?: No    PREVENTIVE SCREENINGS      Cardiovascular Screening:    General: History Lipid Disorder and Risks and Benefits Discussed    Due for: Lipid Panel      Diabetes Screening:     General: Screening Current    Due for: Blood Glucose      Colorectal Cancer Screening:     General: Screening Current      Prostate Cancer Screening:    General: Screening Current and Risks and Benefits Discussed      Osteoporosis Screening:    General: Patient Declines and Risks and Benefits Discussed      Abdominal Aortic Aneurysm (AAA) Screening:    Risk factors include: age between 65-74 yo and tobacco use        General: Risks and Benefits Discussed and Screening Current      Lung Cancer Screening:     General: Screening Not Indicated      Hepatitis C Screening:    General: Screening Current    Screening, Brief Intervention, and Referral to Treatment (SBIRT)    Screening  Typical number of drinks in a day: 0  Typical number of drinks in a week: 0  Interpretation: Low risk drinking behavior.    Single Item Drug Screening:  How often have you used an illegal drug (including marijuana) or a prescription medication for non-medical reasons in the past year? never    Single Item Drug Screen Score: 0  Interpretation: Negative screen for possible drug use  "disorder    Brief Intervention  Alcohol & drug use screenings were reviewed. No concerns regarding substance use disorder identified. Healthy alcohol use/limits discussed.     Other Counseling Topics:   Car/seat belt/driving safety, skin self-exam, sunscreen and calcium and vitamin D intake and regular weightbearing exercise.     No results found.     Physical Exam:     /88 (BP Location: Right arm)   Pulse 85   Temp 97.9 °F (36.6 °C)   Resp 16   Ht 5' 9\" (1.753 m)   Wt 108 kg (238 lb 3.2 oz)   SpO2 97%   BMI 35.18 kg/m²     Physical Exam  Vitals and nursing note reviewed.   Constitutional:       General: He is not in acute distress.     Appearance: Normal appearance.   HENT:      Head: Normocephalic and atraumatic.   Cardiovascular:      Rate and Rhythm: Normal rate and regular rhythm.      Pulses: Normal pulses.   Pulmonary:      Effort: Pulmonary effort is normal.      Breath sounds: Normal breath sounds.   Skin:     General: Skin is warm and dry.   Neurological:      General: No focal deficit present.      Mental Status: He is alert. Mental status is at baseline.   Psychiatric:         Mood and Affect: Mood normal.          SCOTT Madrigal  "

## 2024-06-25 DIAGNOSIS — I10 HYPERTENSION, UNSPECIFIED TYPE: ICD-10-CM

## 2024-06-25 RX ORDER — METOPROLOL SUCCINATE 100 MG/1
100 TABLET, EXTENDED RELEASE ORAL DAILY
Qty: 90 TABLET | Refills: 1 | Status: SHIPPED | OUTPATIENT
Start: 2024-06-25

## 2024-07-18 ENCOUNTER — APPOINTMENT (OUTPATIENT)
Dept: LAB | Facility: MEDICAL CENTER | Age: 74
End: 2024-07-18
Payer: MEDICARE

## 2024-07-18 DIAGNOSIS — Z12.5 SCREENING FOR PROSTATE CANCER: ICD-10-CM

## 2024-07-18 DIAGNOSIS — I10 ESSENTIAL HYPERTENSION: ICD-10-CM

## 2024-07-18 DIAGNOSIS — R31.0 GROSS HEMATURIA: ICD-10-CM

## 2024-07-18 DIAGNOSIS — G35 MULTIPLE SCLEROSIS (HCC): ICD-10-CM

## 2024-07-18 DIAGNOSIS — E78.1 HYPERTRIGLYCERIDEMIA: ICD-10-CM

## 2024-07-18 LAB
ANION GAP SERPL CALCULATED.3IONS-SCNC: 8 MMOL/L (ref 4–13)
BASOPHILS # BLD AUTO: 0.05 THOUSANDS/ÂΜL (ref 0–0.1)
BASOPHILS NFR BLD AUTO: 1 % (ref 0–1)
BUN SERPL-MCNC: 31 MG/DL (ref 5–25)
CALCIUM SERPL-MCNC: 9.1 MG/DL (ref 8.4–10.2)
CHLORIDE SERPL-SCNC: 109 MMOL/L (ref 96–108)
CHOLEST SERPL-MCNC: 163 MG/DL
CO2 SERPL-SCNC: 27 MMOL/L (ref 21–32)
CREAT SERPL-MCNC: 0.98 MG/DL (ref 0.6–1.3)
EOSINOPHIL # BLD AUTO: 0.27 THOUSAND/ÂΜL (ref 0–0.61)
EOSINOPHIL NFR BLD AUTO: 4 % (ref 0–6)
ERYTHROCYTE [DISTWIDTH] IN BLOOD BY AUTOMATED COUNT: 13.6 % (ref 11.6–15.1)
GFR SERPL CREATININE-BSD FRML MDRD: 76 ML/MIN/1.73SQ M
GLUCOSE P FAST SERPL-MCNC: 96 MG/DL (ref 65–99)
HCT VFR BLD AUTO: 45.3 % (ref 36.5–49.3)
HDLC SERPL-MCNC: 47 MG/DL
HGB BLD-MCNC: 15 G/DL (ref 12–17)
IMM GRANULOCYTES # BLD AUTO: 0.02 THOUSAND/UL (ref 0–0.2)
IMM GRANULOCYTES NFR BLD AUTO: 0 % (ref 0–2)
LDLC SERPL CALC-MCNC: 102 MG/DL (ref 0–100)
LYMPHOCYTES # BLD AUTO: 1.79 THOUSANDS/ÂΜL (ref 0.6–4.47)
LYMPHOCYTES NFR BLD AUTO: 25 % (ref 14–44)
MCH RBC QN AUTO: 30.6 PG (ref 26.8–34.3)
MCHC RBC AUTO-ENTMCNC: 33.1 G/DL (ref 31.4–37.4)
MCV RBC AUTO: 92 FL (ref 82–98)
MONOCYTES # BLD AUTO: 0.68 THOUSAND/ÂΜL (ref 0.17–1.22)
MONOCYTES NFR BLD AUTO: 9 % (ref 4–12)
NEUTROPHILS # BLD AUTO: 4.46 THOUSANDS/ÂΜL (ref 1.85–7.62)
NEUTS SEG NFR BLD AUTO: 61 % (ref 43–75)
NONHDLC SERPL-MCNC: 116 MG/DL
NRBC BLD AUTO-RTO: 0 /100 WBCS
PLATELET # BLD AUTO: 236 THOUSANDS/UL (ref 149–390)
PMV BLD AUTO: 10.6 FL (ref 8.9–12.7)
POTASSIUM SERPL-SCNC: 4.7 MMOL/L (ref 3.5–5.3)
PSA SERPL-MCNC: 1.6 NG/ML (ref 0–4)
RBC # BLD AUTO: 4.9 MILLION/UL (ref 3.88–5.62)
SODIUM SERPL-SCNC: 144 MMOL/L (ref 135–147)
TRIGL SERPL-MCNC: 70 MG/DL
WBC # BLD AUTO: 7.27 THOUSAND/UL (ref 4.31–10.16)

## 2024-07-18 PROCEDURE — 80048 BASIC METABOLIC PNL TOTAL CA: CPT

## 2024-07-18 PROCEDURE — 80061 LIPID PANEL: CPT

## 2024-07-18 PROCEDURE — 36415 COLL VENOUS BLD VENIPUNCTURE: CPT

## 2024-07-18 PROCEDURE — G0103 PSA SCREENING: HCPCS

## 2024-07-18 PROCEDURE — 85025 COMPLETE CBC W/AUTO DIFF WBC: CPT

## 2024-09-04 ENCOUNTER — HOSPITAL ENCOUNTER (OUTPATIENT)
Dept: RADIOLOGY | Facility: MEDICAL CENTER | Age: 74
Discharge: HOME | End: 2024-09-04
Payer: MEDICARE

## 2024-09-04 DIAGNOSIS — I71.20 THORACIC AORTIC ANEURYSM WITHOUT RUPTURE, UNSPECIFIED PART (HCC): ICD-10-CM

## 2024-09-04 PROCEDURE — G1004 CDSM NDSC: HCPCS

## 2024-09-04 PROCEDURE — 71275 CT ANGIOGRAPHY CHEST: CPT

## 2024-09-04 RX ADMIN — IOHEXOL 85 ML: 350 INJECTION, SOLUTION INTRAVENOUS at 10:38

## 2024-10-30 ENCOUNTER — RA CDI HCC (OUTPATIENT)
Dept: OTHER | Facility: HOSPITAL | Age: 74
End: 2024-10-30

## 2024-11-06 ENCOUNTER — OFFICE VISIT (OUTPATIENT)
Dept: FAMILY MEDICINE CLINIC | Facility: CLINIC | Age: 74
End: 2024-11-06
Payer: MEDICARE

## 2024-11-06 VITALS
OXYGEN SATURATION: 95 % | WEIGHT: 222 LBS | HEART RATE: 69 BPM | DIASTOLIC BLOOD PRESSURE: 86 MMHG | SYSTOLIC BLOOD PRESSURE: 124 MMHG | TEMPERATURE: 97.7 F | BODY MASS INDEX: 32.88 KG/M2 | HEIGHT: 69 IN | RESPIRATION RATE: 16 BRPM

## 2024-11-06 DIAGNOSIS — R39.81 FUNCTIONAL URINARY INCONTINENCE: ICD-10-CM

## 2024-11-06 DIAGNOSIS — Z23 ENCOUNTER FOR IMMUNIZATION: ICD-10-CM

## 2024-11-06 DIAGNOSIS — G35 MULTIPLE SCLEROSIS (HCC): Primary | ICD-10-CM

## 2024-11-06 DIAGNOSIS — I48.19 PERSISTENT ATRIAL FIBRILLATION (HCC): ICD-10-CM

## 2024-11-06 DIAGNOSIS — E78.1 HYPERTRIGLYCERIDEMIA: ICD-10-CM

## 2024-11-06 DIAGNOSIS — R26.2 AMBULATORY DYSFUNCTION: ICD-10-CM

## 2024-11-06 PROCEDURE — G0008 ADMIN INFLUENZA VIRUS VAC: HCPCS | Performed by: NURSE PRACTITIONER

## 2024-11-06 PROCEDURE — 90662 IIV NO PRSV INCREASED AG IM: CPT | Performed by: NURSE PRACTITIONER

## 2024-11-06 PROCEDURE — 99214 OFFICE O/P EST MOD 30 MIN: CPT | Performed by: NURSE PRACTITIONER

## 2024-11-06 RX ORDER — OXYBUTYNIN CHLORIDE 10 MG/1
10 TABLET, EXTENDED RELEASE ORAL DAILY
Qty: 90 TABLET | Refills: 3 | Status: SHIPPED | OUTPATIENT
Start: 2024-11-06

## 2024-11-06 RX ORDER — METHENAMINE HIPPURATE 1000 MG/1
1 TABLET ORAL 2 TIMES DAILY WITH MEALS
Qty: 90 TABLET | Refills: 3 | Status: SHIPPED | OUTPATIENT
Start: 2024-11-06 | End: 2024-11-11 | Stop reason: SDUPTHER

## 2024-11-06 RX ORDER — FINASTERIDE 5 MG/1
5 TABLET, FILM COATED ORAL DAILY
Qty: 90 TABLET | Refills: 3 | Status: SHIPPED | OUTPATIENT
Start: 2024-11-06

## 2024-11-06 RX ORDER — OXYBUTYNIN CHLORIDE 10 MG/1
10 TABLET, EXTENDED RELEASE ORAL DAILY
COMMUNITY
Start: 2024-09-19 | End: 2024-11-06 | Stop reason: SDUPTHER

## 2024-11-06 RX ORDER — TAMSULOSIN HYDROCHLORIDE 0.4 MG/1
0.4 CAPSULE ORAL
Qty: 90 CAPSULE | Refills: 3 | Status: SHIPPED | OUTPATIENT
Start: 2024-11-06

## 2024-11-06 NOTE — PROGRESS NOTES
Ambulatory Visit  Name: Phong Guillen      : 1950      MRN: [id number]  Encounter Provider: SCOTT Madrigal  Encounter Date: 2024   Encounter department: [facility] [place]    Assessment & Plan  Encounter for immunization    Orders:    influenza vaccine, high-dose, PF 0.5 mL (Fluzone High Dose)    Functional urinary incontinence  Stable on medications  Orders:    tamsulosin (FLOMAX) 0.4 mg; Take 1 capsule (0.4 mg total) by mouth daily with dinner    oxybutynin (DITROPAN-XL) 10 MG 24 hr tablet; Take 1 tablet (10 mg total) by mouth daily    methenamine hippurate (HIPREX) 1 g tablet; Take 1 tablet (1 g total) by mouth 2 (two) times a day with meals TAKE 1 in AM and 1/2 TAB PM    finasteride (PROSCAR) 5 mg tablet; Take 1 tablet (5 mg total) by mouth daily    Multiple sclerosis (HCC)  Clinically stable at present  Managed by [facility] neurology       Hypertriglyceridemia  Diet controlled       Ambulatory dysfunction  Related to multiple sclerosis  Uses cane always       Persistent atrial fibrillation (HCC)  Taking aspirin     Patient clinically stable at this time.  Cont with current plan of care.   Fall precautions  RTO as recommended and PRN     History of Present Illness     75 yo male with h/o MS, aortic root aneurysm repair and AVR (at [facility]), PAF, presents for his 6-month follow-up    Reports in usual state of health-in good spirits today  Sees cardio, neuro regularly    His MS is gradually progressing. He has tried multiple meds to delay progression however has opted to forego treatment at this time as the side effects were more problematic than the s/s of MS. He feels his MS is stable. He uses a cane. He knows his limits    We reviewed recent labs-A1c, CBC, metabolic panel, PSA in acceptable ranges    He has no c/o today    Requesting flu shot today          Review of Systems   Constitutional: Negative.    HENT:  Negative for trouble swallowing.    Respiratory:  Negative for  "cough and shortness of breath.    Cardiovascular: Negative.    Gastrointestinal:  Negative for abdominal pain and blood in stool.   Genitourinary:  Negative for difficulty urinating and dysuria.   Musculoskeletal:  Positive for arthralgias and gait problem.   Skin:  Negative for color change and pallor.   Neurological:  Positive for weakness. Negative for dizziness, seizures, syncope and headaches.   Hematological:  Does not bruise/bleed easily.   Psychiatric/Behavioral:  Negative for dysphoric mood, sleep disturbance and suicidal ideas. The patient is not nervous/anxious.            Objective     /86   Pulse 69   Temp 97.7 °F (36.5 °C) (Temporal)   Resp 16   Ht 5' 9\" (1.753 m)   Wt 101 kg (222 lb)   SpO2 95%   BMI 32.78 kg/m²     Physical Exam  Vitals and nursing note reviewed.   Constitutional:       General: He is not in acute distress.     Appearance: Normal appearance.   HENT:      Head: Normocephalic and atraumatic.   Cardiovascular:      Rate and Rhythm: Normal rate and regular rhythm.      Pulses: Normal pulses.      Heart sounds: Murmur heard.   Pulmonary:      Effort: Pulmonary effort is normal.      Breath sounds: Normal breath sounds.   Abdominal:      General: Bowel sounds are normal.      Palpations: Abdomen is soft.      Tenderness: There is no abdominal tenderness.   Musculoskeletal:      Right lower leg: No edema.      Left lower leg: No edema.   Lymphadenopathy:      Cervical: No cervical adenopathy.   Skin:     General: Skin is warm and dry.      Coloration: Skin is not pale.   Neurological:      General: No focal deficit present.      Mental Status: He is alert. Mental status is at baseline.      Gait: Gait abnormal.   Psychiatric:         Mood and Affect: Mood normal.         Behavior: Behavior normal.         "

## 2024-11-06 NOTE — ASSESSMENT & PLAN NOTE
Taking aspirin     Patient clinically stable at this time.  Cont with current plan of care.   Fall precautions  RTO as recommended and PRN

## 2024-11-06 NOTE — ASSESSMENT & PLAN NOTE
Stable on medications  Orders:    tamsulosin (FLOMAX) 0.4 mg; Take 1 capsule (0.4 mg total) by mouth daily with dinner    oxybutynin (DITROPAN-XL) 10 MG 24 hr tablet; Take 1 tablet (10 mg total) by mouth daily    methenamine hippurate (HIPREX) 1 g tablet; Take 1 tablet (1 g total) by mouth 2 (two) times a day with meals TAKE 1 in AM and 1/2 TAB PM    finasteride (PROSCAR) 5 mg tablet; Take 1 tablet (5 mg total) by mouth daily

## 2024-11-11 ENCOUNTER — TELEPHONE (OUTPATIENT)
Age: 74
End: 2024-11-11

## 2024-11-11 DIAGNOSIS — R39.81 FUNCTIONAL URINARY INCONTINENCE: ICD-10-CM

## 2024-11-11 RX ORDER — METHENAMINE HIPPURATE 1000 MG/1
1 TABLET ORAL 2 TIMES DAILY WITH MEALS
Qty: 90 TABLET | Refills: 3 | Status: SHIPPED | OUTPATIENT
Start: 2024-11-11

## 2024-11-11 NOTE — TELEPHONE ENCOUNTER
Pt pharmacy called in stating that there are two different instructions for the methenamine hippurate. Need confirmation. Please advise.

## 2025-03-14 DIAGNOSIS — I10 HYPERTENSION, UNSPECIFIED TYPE: ICD-10-CM

## 2025-03-19 RX ORDER — METOPROLOL SUCCINATE 100 MG/1
100 TABLET, EXTENDED RELEASE ORAL DAILY
Qty: 90 TABLET | Refills: 0 | Status: SHIPPED | OUTPATIENT
Start: 2025-03-19

## 2025-05-12 ENCOUNTER — RA CDI HCC (OUTPATIENT)
Dept: OTHER | Facility: HOSPITAL | Age: 75
End: 2025-05-12

## 2025-05-20 ENCOUNTER — OFFICE VISIT (OUTPATIENT)
Dept: FAMILY MEDICINE CLINIC | Facility: CLINIC | Age: 75
End: 2025-05-20
Payer: MEDICARE

## 2025-05-20 VITALS
RESPIRATION RATE: 17 BRPM | TEMPERATURE: 97.9 F | HEART RATE: 76 BPM | DIASTOLIC BLOOD PRESSURE: 68 MMHG | OXYGEN SATURATION: 97 % | SYSTOLIC BLOOD PRESSURE: 118 MMHG | HEIGHT: 69 IN | BODY MASS INDEX: 33.77 KG/M2 | WEIGHT: 228 LBS

## 2025-05-20 DIAGNOSIS — Z00.00 MEDICARE ANNUAL WELLNESS VISIT, SUBSEQUENT: Primary | ICD-10-CM

## 2025-05-20 DIAGNOSIS — E78.1 HYPERTRIGLYCERIDEMIA: ICD-10-CM

## 2025-05-20 DIAGNOSIS — G35 MULTIPLE SCLEROSIS (HCC): ICD-10-CM

## 2025-05-20 DIAGNOSIS — E66.01 OBESITY, MORBID (HCC): ICD-10-CM

## 2025-05-20 DIAGNOSIS — I48.19 PERSISTENT ATRIAL FIBRILLATION (HCC): ICD-10-CM

## 2025-05-20 DIAGNOSIS — Z13.29 SCREENING FOR THYROID DISORDER: ICD-10-CM

## 2025-05-20 DIAGNOSIS — Z12.5 SCREENING FOR PROSTATE CANCER: ICD-10-CM

## 2025-05-20 DIAGNOSIS — Z13.0 SCREENING, DEFICIENCY ANEMIA, IRON: ICD-10-CM

## 2025-05-20 PROCEDURE — G0439 PPPS, SUBSEQ VISIT: HCPCS | Performed by: NURSE PRACTITIONER

## 2025-05-20 NOTE — PROGRESS NOTES
Name: Phong Guillen      : 1950      MRN: [id number]  Encounter Provider: SCOTT Zaman  Encounter Date: 2025   Encounter department: [facility] [place]  :  Assessment & Plan  Medicare annual wellness visit, subsequent            Preventive health issues were discussed with patient, and age appropriate screening tests were ordered as noted in patient's After Visit Summary. Personalized health advice and appropriate referrals for health education or preventive services given if needed, as noted in patient's After Visit Summary.    History of Present Illness     HPI   Patient Care Team:  SCOTT Zaman as PCP - General (Family Medicine)  Kg Ziegler MD (Inactive)  Junaid Maria MD    Review of Systems  Medical History Reviewed by provider this encounter:       Annual Wellness Visit Questionnaire   Phong is here for his Subsequent Wellness visit. Last Medicare Wellness visit information reviewed, patient interviewed and updates made to the record today.      Health Risk Assessment:   Patient rates overall health as fair. Patient feels that their physical health rating is slightly worse. Patient is dissatisfied with their life. Eyesight was rated as same. Hearing was rated as same. Patient feels that their emotional and mental health rating is same. Patients states they are never, rarely angry. Patient states they are often unusually tired/fatigued. Pain experienced in the last 7 days has been some. Patient's pain rating has been 2/10. Patient states that he has experienced no weight loss or gain in last 6 months.     Depression Screening:   PHQ-9 Score: 3      Fall Risk Screening:   In the past year, patient has experienced: no history of falling in past year      Home Safety:  Patient has trouble with stairs inside or outside of their home. Patient has working smoke alarms and has working carbon monoxide detector. Home safety hazards include: loose rugs on the floor and  not having non-slip bath and/or shower mats.     Nutrition:   Current diet is Regular and Limited junk food.     Medications:   Patient is currently taking over-the-counter supplements. OTC medications include: see medication list. Patient is able to manage medications.     Activities of Daily Living (ADLs)/Instrumental Activities of Daily Living (IADLs):   Walk and transfer into and out of bed and chair?: Yes  Dress and groom yourself?: Yes    Bathe or shower yourself?: Yes    Feed yourself? Yes  Do your laundry/housekeeping?: Yes  Manage your money, pay your bills and track your expenses?: Yes  Make your own meals?: Yes    Do your own shopping?: Yes    Previous Hospitalizations:   Any hospitalizations or ED visits within the last 12 months?: No      Advance Care Planning:   Living will: Yes    Durable POA for healthcare: Yes    Advanced directive: Yes    Advanced directive counseling given: Yes    End of Life Decisions reviewed with patient: Yes      Cognitive Screening:   Provider or family/friend/caregiver concerned regarding cognition?: No    Preventive Screenings      Cardiovascular Screening:    General: History Lipid Disorder and Risks and Benefits Discussed    Due for: Lipid Panel      Diabetes Screening:     General: Risks and Benefits Discussed    Due for: Blood Glucose      Colorectal Cancer Screening:     General: Screening Current      Prostate Cancer Screening:    General: Risks and Benefits Discussed    Due for: PSA      Osteoporosis Screening:    General: Screening Not Indicated      Abdominal Aortic Aneurysm (AAA) Screening:    Risk factors include: age between 65-76 yo and tobacco use        General: Screening Not Indicated      Lung Cancer Screening:     General: Screening Not Indicated      Hepatitis C Screening:    General: Screening Current    Screening, Brief Intervention, and Referral to Treatment (SBIRT)     Screening  Typical number of drinks in a day: 1  Typical number of drinks in a  week: 4  Interpretation: Low risk drinking behavior.    AUDIT-C Screenin) How often did you have a drink containing alcohol in the past year? 2 to 3 times a week  2) How many drinks did you have on a typical day when you were drinking in the past year? 1 to 2  3) How often did you have 6 or more drinks on one occasion in the past year? never    AUDIT-C Score: 3  Interpretation: Score 0-3 (male): Negative screen for alcohol misuse    Single Item Drug Screening:  How often have you used an illegal drug (including marijuana) or a prescription medication for non-medical reasons in the past year? never    Single Item Drug Screen Score: 0  Interpretation: Negative screen for possible drug use disorder    Brief Intervention  Alcohol & drug use screenings were reviewed. No concerns regarding substance use disorder identified.     Other Counseling Topics:   Regular weightbearing exercise and calcium and vitamin D intake.     Social Drivers of Health     Financial Resource Strain: Low Risk  (4/10/2024)    Received from [facility]    Overall Financial Resource Strain (CARDIA)    • Difficulty of Paying Living Expenses: Not hard at all   Food Insecurity: No Food Insecurity (5/15/2025)    Nursing - Inadequate Food Risk Classification    • Worried About Running Out of Food in the Last Year: Never true    • Ran Out of Food in the Last Year: Never true   Transportation Needs: No Transportation Needs (5/15/2025)    PRAPARE - Transportation    • Lack of Transportation (Medical): No    • Lack of Transportation (Non-Medical): No   Housing Stability: Unknown (5/15/2025)    Housing Stability Vital Sign    • Unable to Pay for Housing in the Last Year: No    • Homeless in the Last Year: No   Utilities: Not At Risk (5/15/2025)    [facility] Utilities    • Threatened with loss of utilities: No     No results found.    Objective   /68 (BP Location: Left arm, Patient Position: Sitting, Cuff Size: Large)   Pulse 76   Temp  "97.9 °F (36.6 °C) (Temporal)   Resp 17   Ht 5' 9\" (1.753 m)   Wt 103 kg (228 lb)   SpO2 97%   BMI 33.67 kg/m²     Physical Exam  Vitals and nursing note reviewed.   Constitutional:       Appearance: Normal appearance.     Cardiovascular:      Rate and Rhythm: Normal rate and regular rhythm.      Pulses: Normal pulses.      Heart sounds: Murmur heard.   Pulmonary:      Effort: Pulmonary effort is normal.      Breath sounds: Normal breath sounds.     Neurological:      General: No focal deficit present.      Mental Status: He is alert. Mental status is at baseline.      Motor: Weakness present.      Gait: Gait abnormal.     "

## 2025-05-25 DIAGNOSIS — I10 HYPERTENSION, UNSPECIFIED TYPE: ICD-10-CM

## 2025-05-26 RX ORDER — METOPROLOL SUCCINATE 100 MG/1
100 TABLET, EXTENDED RELEASE ORAL DAILY
Qty: 90 TABLET | Refills: 0 | Status: SHIPPED | OUTPATIENT
Start: 2025-05-26

## 2025-08-05 DIAGNOSIS — I10 HYPERTENSION, UNSPECIFIED TYPE: ICD-10-CM

## 2025-08-07 RX ORDER — METOPROLOL SUCCINATE 100 MG/1
100 TABLET, EXTENDED RELEASE ORAL DAILY
Qty: 90 TABLET | Refills: 1 | Status: SHIPPED | OUTPATIENT
Start: 2025-08-07